# Patient Record
Sex: FEMALE | Race: WHITE | NOT HISPANIC OR LATINO | Employment: UNEMPLOYED | URBAN - METROPOLITAN AREA
[De-identification: names, ages, dates, MRNs, and addresses within clinical notes are randomized per-mention and may not be internally consistent; named-entity substitution may affect disease eponyms.]

---

## 2017-01-17 ENCOUNTER — ALLSCRIPTS OFFICE VISIT (OUTPATIENT)
Dept: PERINATAL CARE | Facility: MEDICAL CENTER | Age: 37
End: 2017-01-17
Payer: COMMERCIAL

## 2017-01-17 ENCOUNTER — GENERIC CONVERSION - ENCOUNTER (OUTPATIENT)
Dept: OTHER | Facility: OTHER | Age: 37
End: 2017-01-17

## 2017-01-17 PROCEDURE — 76816 OB US FOLLOW-UP PER FETUS: CPT | Performed by: OBSTETRICS & GYNECOLOGY

## 2017-01-21 ENCOUNTER — ALLSCRIPTS OFFICE VISIT (OUTPATIENT)
Dept: OTHER | Facility: OTHER | Age: 37
End: 2017-01-21

## 2017-01-21 LAB
GLUCOSE (HISTORICAL): NORMAL
PROT UR STRIP-MCNC: NORMAL MG/DL

## 2017-01-30 ENCOUNTER — GENERIC CONVERSION - ENCOUNTER (OUTPATIENT)
Dept: OTHER | Facility: OTHER | Age: 37
End: 2017-01-30

## 2017-02-01 LAB — EXTERNAL GROUP B STREP ANTIGEN: NEGATIVE

## 2017-02-03 ENCOUNTER — ALLSCRIPTS OFFICE VISIT (OUTPATIENT)
Dept: OTHER | Facility: OTHER | Age: 37
End: 2017-02-03

## 2017-02-03 LAB
GLUCOSE (HISTORICAL): NEGATIVE
PROT UR STRIP-MCNC: NEGATIVE MG/DL

## 2017-02-13 ENCOUNTER — HOSPITAL ENCOUNTER (EMERGENCY)
Facility: HOSPITAL | Age: 37
Discharge: HOME | End: 2017-02-13
Attending: EMERGENCY MEDICINE | Admitting: EMERGENCY MEDICINE
Payer: COMMERCIAL

## 2017-02-13 VITALS
DIASTOLIC BLOOD PRESSURE: 61 MMHG | TEMPERATURE: 98.1 F | RESPIRATION RATE: 16 BRPM | SYSTOLIC BLOOD PRESSURE: 122 MMHG | OXYGEN SATURATION: 100 % | WEIGHT: 184.9 LBS | BODY MASS INDEX: 28.96 KG/M2 | HEART RATE: 109 BPM

## 2017-02-13 DIAGNOSIS — J40 BRONCHITIS: Primary | ICD-10-CM

## 2017-02-13 DIAGNOSIS — Z3A.33 33 WEEKS GESTATION OF PREGNANCY: ICD-10-CM

## 2017-02-13 DIAGNOSIS — R06.2 WHEEZE: ICD-10-CM

## 2017-02-13 PROCEDURE — 94640 AIRWAY INHALATION TREATMENT: CPT

## 2017-02-13 PROCEDURE — 99284 EMERGENCY DEPT VISIT MOD MDM: CPT

## 2017-02-13 RX ORDER — AZITHROMYCIN 250 MG/1
TABLET, FILM COATED ORAL
Qty: 6 TABLET | Refills: 0 | Status: SHIPPED | OUTPATIENT
Start: 2017-02-13 | End: 2017-02-21

## 2017-02-13 RX ORDER — PREDNISONE 20 MG/1
60 TABLET ORAL ONCE
Status: DISCONTINUED | OUTPATIENT
Start: 2017-02-13 | End: 2017-02-13 | Stop reason: HOSPADM

## 2017-02-13 RX ORDER — PREDNISONE 20 MG/1
60 TABLET ORAL DAILY
Qty: 15 TABLET | Refills: 0 | Status: SHIPPED | OUTPATIENT
Start: 2017-02-13 | End: 2017-02-18

## 2017-02-13 RX ORDER — ALBUTEROL SULFATE 1.25 MG/3ML
1 SOLUTION RESPIRATORY (INHALATION) EVERY 6 HOURS PRN
Qty: 30 VIAL | Refills: 0 | Status: SHIPPED | OUTPATIENT
Start: 2017-02-13 | End: 2017-02-21

## 2017-02-13 RX ADMIN — Medication 5 MG: at 08:24

## 2017-02-13 RX ADMIN — ALBUTEROL SULFATE 5 MG: 2.5 SOLUTION RESPIRATORY (INHALATION) at 08:24

## 2017-02-13 RX ADMIN — Medication 0.5 MG: at 08:24

## 2017-02-13 RX ADMIN — IPRATROPIUM BROMIDE 0.5 MG: 0.5 SOLUTION RESPIRATORY (INHALATION) at 08:24

## 2017-02-14 ENCOUNTER — GENERIC CONVERSION - ENCOUNTER (OUTPATIENT)
Dept: OTHER | Facility: OTHER | Age: 37
End: 2017-02-14

## 2017-02-14 ENCOUNTER — ALLSCRIPTS OFFICE VISIT (OUTPATIENT)
Dept: PERINATAL CARE | Facility: MEDICAL CENTER | Age: 37
End: 2017-02-14
Payer: COMMERCIAL

## 2017-02-14 PROCEDURE — 76816 OB US FOLLOW-UP PER FETUS: CPT | Performed by: OBSTETRICS & GYNECOLOGY

## 2017-02-18 ENCOUNTER — GENERIC CONVERSION - ENCOUNTER (OUTPATIENT)
Dept: OTHER | Facility: OTHER | Age: 37
End: 2017-02-18

## 2017-02-19 ENCOUNTER — HOSPITAL ENCOUNTER (EMERGENCY)
Facility: HOSPITAL | Age: 37
Discharge: HOME | End: 2017-02-19
Attending: EMERGENCY MEDICINE
Payer: COMMERCIAL

## 2017-02-19 VITALS
BODY MASS INDEX: 29 KG/M2 | DIASTOLIC BLOOD PRESSURE: 59 MMHG | OXYGEN SATURATION: 95 % | HEART RATE: 88 BPM | RESPIRATION RATE: 18 BRPM | SYSTOLIC BLOOD PRESSURE: 120 MMHG | TEMPERATURE: 98.4 F | WEIGHT: 185.19 LBS

## 2017-02-19 DIAGNOSIS — J45.901 ASTHMA EXACERBATION: Primary | ICD-10-CM

## 2017-02-19 LAB
ALBUMIN SERPL BCP-MCNC: 2.5 G/DL (ref 3.5–5)
ALP SERPL-CCNC: 98 U/L (ref 46–116)
ALT SERPL W P-5'-P-CCNC: 23 U/L (ref 12–78)
ANION GAP SERPL CALCULATED.3IONS-SCNC: 9 MMOL/L (ref 4–13)
AST SERPL W P-5'-P-CCNC: 19 U/L (ref 5–45)
BASOPHILS # BLD AUTO: 0.02 THOUSANDS/ΜL (ref 0–0.1)
BASOPHILS NFR BLD AUTO: 0 % (ref 0–1)
BILIRUB SERPL-MCNC: 0.1 MG/DL (ref 0.2–1)
BUN SERPL-MCNC: 4 MG/DL (ref 5–25)
CALCIUM SERPL-MCNC: 8.4 MG/DL (ref 8.3–10.1)
CHLORIDE SERPL-SCNC: 103 MMOL/L (ref 100–108)
CO2 SERPL-SCNC: 28 MMOL/L (ref 21–32)
CREAT SERPL-MCNC: 0.6 MG/DL (ref 0.6–1.3)
EOSINOPHIL # BLD AUTO: 0.28 THOUSAND/ΜL (ref 0–0.61)
EOSINOPHIL NFR BLD AUTO: 2 % (ref 0–6)
ERYTHROCYTE [DISTWIDTH] IN BLOOD BY AUTOMATED COUNT: 13.4 % (ref 11.6–15.1)
GFR SERPL CREATININE-BSD FRML MDRD: >60 ML/MIN/1.73SQ M
GLUCOSE SERPL-MCNC: 87 MG/DL (ref 65–140)
HCT VFR BLD AUTO: 33.7 % (ref 34.8–46.1)
HGB BLD-MCNC: 10.9 G/DL (ref 11.5–15.4)
LYMPHOCYTES # BLD AUTO: 3.95 THOUSANDS/ΜL (ref 0.6–4.47)
LYMPHOCYTES NFR BLD AUTO: 24 % (ref 14–44)
MAGNESIUM SERPL-MCNC: 1.7 MG/DL (ref 1.6–2.6)
MCH RBC QN AUTO: 30.8 PG (ref 26.8–34.3)
MCHC RBC AUTO-ENTMCNC: 32.3 G/DL (ref 31.4–37.4)
MCV RBC AUTO: 95 FL (ref 82–98)
MONOCYTES # BLD AUTO: 1.06 THOUSAND/ΜL (ref 0.17–1.22)
MONOCYTES NFR BLD AUTO: 6 % (ref 4–12)
NEUTROPHILS # BLD AUTO: 11.35 THOUSANDS/ΜL (ref 1.85–7.62)
NEUTS SEG NFR BLD AUTO: 68 % (ref 43–75)
PLATELET # BLD AUTO: 210 THOUSANDS/UL (ref 149–390)
PMV BLD AUTO: 11.1 FL (ref 8.9–12.7)
POTASSIUM SERPL-SCNC: 3.8 MMOL/L (ref 3.5–5.3)
PROT SERPL-MCNC: 6.7 G/DL (ref 6.4–8.2)
RBC # BLD AUTO: 3.54 MILLION/UL (ref 3.81–5.12)
SODIUM SERPL-SCNC: 140 MMOL/L (ref 136–145)
WBC # BLD AUTO: 16.66 THOUSAND/UL (ref 4.31–10.16)

## 2017-02-19 PROCEDURE — 85025 COMPLETE CBC W/AUTO DIFF WBC: CPT | Performed by: EMERGENCY MEDICINE

## 2017-02-19 PROCEDURE — 83735 ASSAY OF MAGNESIUM: CPT | Performed by: EMERGENCY MEDICINE

## 2017-02-19 PROCEDURE — 99283 EMERGENCY DEPT VISIT LOW MDM: CPT

## 2017-02-19 PROCEDURE — 36415 COLL VENOUS BLD VENIPUNCTURE: CPT | Performed by: EMERGENCY MEDICINE

## 2017-02-19 PROCEDURE — 94640 AIRWAY INHALATION TREATMENT: CPT

## 2017-02-19 PROCEDURE — 80053 COMPREHEN METABOLIC PANEL: CPT | Performed by: EMERGENCY MEDICINE

## 2017-02-19 RX ORDER — ALBUTEROL SULFATE 2.5 MG/3ML
5 SOLUTION RESPIRATORY (INHALATION) ONCE
Status: COMPLETED | OUTPATIENT
Start: 2017-02-19 | End: 2017-02-19

## 2017-02-19 RX ORDER — PREDNISONE 20 MG/1
60 TABLET ORAL ONCE
Status: COMPLETED | OUTPATIENT
Start: 2017-02-19 | End: 2017-02-19

## 2017-02-19 RX ORDER — ALBUTEROL SULFATE 2.5 MG/3ML
2.5 SOLUTION RESPIRATORY (INHALATION) EVERY 6 HOURS PRN
Qty: 75 ML | Refills: 0 | Status: ON HOLD | OUTPATIENT
Start: 2017-02-19 | End: 2017-03-19 | Stop reason: ALTCHOICE

## 2017-02-19 RX ORDER — BUPRENORPHINE 2 MG/1
8 TABLET SUBLINGUAL DAILY
COMMUNITY
End: 2021-08-09

## 2017-02-19 RX ORDER — PREDNISONE 10 MG/1
TABLET ORAL
Qty: 20 TABLET | Refills: 0 | Status: ON HOLD | OUTPATIENT
Start: 2017-02-19 | End: 2017-03-19 | Stop reason: ALTCHOICE

## 2017-02-19 RX ADMIN — ALBUTEROL SULFATE 5 MG: 2.5 SOLUTION RESPIRATORY (INHALATION) at 13:58

## 2017-02-19 RX ADMIN — ALBUTEROL SULFATE 5 MG: 2.5 SOLUTION RESPIRATORY (INHALATION) at 12:40

## 2017-02-19 RX ADMIN — PREDNISONE 60 MG: 20 TABLET ORAL at 12:51

## 2017-02-21 ENCOUNTER — HOSPITAL ENCOUNTER (EMERGENCY)
Facility: HOSPITAL | Age: 37
Discharge: HOME | End: 2017-02-22
Attending: EMERGENCY MEDICINE | Admitting: EMERGENCY MEDICINE
Payer: COMMERCIAL

## 2017-02-21 VITALS
RESPIRATION RATE: 16 BRPM | OXYGEN SATURATION: 98 % | HEART RATE: 93 BPM | DIASTOLIC BLOOD PRESSURE: 57 MMHG | SYSTOLIC BLOOD PRESSURE: 127 MMHG | TEMPERATURE: 98.2 F | BODY MASS INDEX: 28.35 KG/M2 | WEIGHT: 181 LBS

## 2017-02-21 DIAGNOSIS — R07.81 RIB PAIN: Primary | ICD-10-CM

## 2017-02-21 RX ORDER — LIDOCAINE 50 MG/G
PATCH TOPICAL
Status: DISCONTINUED
Start: 2017-02-21 | End: 2017-02-22 | Stop reason: HOSPADM

## 2017-02-21 RX ORDER — ACETAMINOPHEN 325 MG/1
TABLET ORAL
Status: COMPLETED
Start: 2017-02-21 | End: 2017-02-21

## 2017-02-21 RX ADMIN — LIDOCAINE 1 PATCH: 50 PATCH CUTANEOUS at 21:15

## 2017-02-21 RX ADMIN — LIDOCAINE 1 PATCH: 50 PATCH TOPICAL at 21:15

## 2017-02-21 RX ADMIN — ACETAMINOPHEN 650 MG: 325 TABLET ORAL at 21:15

## 2017-02-22 PROCEDURE — 99283 EMERGENCY DEPT VISIT LOW MDM: CPT

## 2017-02-22 RX ORDER — ACETAMINOPHEN 325 MG/1
650 TABLET ORAL ONCE
Status: COMPLETED | OUTPATIENT
Start: 2017-02-22 | End: 2017-02-21

## 2017-02-22 RX ORDER — LIDOCAINE 50 MG/G
1 PATCH TOPICAL ONCE
Status: DISCONTINUED | OUTPATIENT
Start: 2017-02-22 | End: 2017-02-22 | Stop reason: HOSPADM

## 2017-03-03 ENCOUNTER — ALLSCRIPTS OFFICE VISIT (OUTPATIENT)
Dept: OTHER | Facility: OTHER | Age: 37
End: 2017-03-03

## 2017-03-06 ENCOUNTER — GENERIC CONVERSION - ENCOUNTER (OUTPATIENT)
Dept: OTHER | Facility: OTHER | Age: 37
End: 2017-03-06

## 2017-03-06 LAB — CULTURE GENITAL-BSB ON (HISTORICAL): NORMAL

## 2017-03-10 ENCOUNTER — GENERIC CONVERSION - ENCOUNTER (OUTPATIENT)
Dept: OTHER | Facility: OTHER | Age: 37
End: 2017-03-10

## 2017-03-19 ENCOUNTER — HOSPITAL ENCOUNTER (OUTPATIENT)
Facility: HOSPITAL | Age: 37
Discharge: HOME | End: 2017-03-19
Attending: OBSTETRICS & GYNECOLOGY | Admitting: OBSTETRICS & GYNECOLOGY
Payer: COMMERCIAL

## 2017-03-19 VITALS
HEART RATE: 88 BPM | RESPIRATION RATE: 18 BRPM | OXYGEN SATURATION: 99 % | TEMPERATURE: 98.3 F | SYSTOLIC BLOOD PRESSURE: 121 MMHG | DIASTOLIC BLOOD PRESSURE: 82 MMHG

## 2017-03-19 PROCEDURE — 99213 OFFICE O/P EST LOW 20 MIN: CPT

## 2017-03-24 ENCOUNTER — GENERIC CONVERSION - ENCOUNTER (OUTPATIENT)
Dept: OTHER | Facility: OTHER | Age: 37
End: 2017-03-24

## 2017-03-27 ENCOUNTER — HOSPITAL ENCOUNTER (OUTPATIENT)
Dept: LABOR AND DELIVERY | Facility: HOSPITAL | Age: 37
Discharge: HOME | DRG: 560 | End: 2017-03-27
Payer: COMMERCIAL

## 2017-03-27 ENCOUNTER — ANESTHESIA EVENT (INPATIENT)
Dept: LABOR AND DELIVERY | Facility: HOSPITAL | Age: 37
DRG: 560 | End: 2017-03-27
Payer: COMMERCIAL

## 2017-03-27 ENCOUNTER — HOSPITAL ENCOUNTER (INPATIENT)
Facility: HOSPITAL | Age: 37
LOS: 2 days | Discharge: HOME | DRG: 560 | End: 2017-03-29
Attending: OBSTETRICS & GYNECOLOGY | Admitting: OBSTETRICS & GYNECOLOGY
Payer: COMMERCIAL

## 2017-03-27 DIAGNOSIS — Z3A.39 39 WEEKS GESTATION OF PREGNANCY: Primary | ICD-10-CM

## 2017-03-27 LAB
ABO GROUP BLD: NORMAL
AMPHETAMINES SERPL QL SCN: NEGATIVE
BARBITURATES UR QL: NEGATIVE
BENZODIAZ UR QL: NEGATIVE
BLD GP AB SCN SERPL QL: NEGATIVE
COCAINE UR QL: NEGATIVE
ERYTHROCYTE [DISTWIDTH] IN BLOOD BY AUTOMATED COUNT: 13.6 % (ref 11.6–15.1)
HCT VFR BLD AUTO: 33.7 % (ref 34.8–46.1)
HGB BLD-MCNC: 11.1 G/DL (ref 11.5–15.4)
MCH RBC QN AUTO: 30.7 PG (ref 26.8–34.3)
MCHC RBC AUTO-ENTMCNC: 32.9 G/DL (ref 31.4–37.4)
MCV RBC AUTO: 93 FL (ref 82–98)
METHADONE UR QL: NEGATIVE
OPIATES UR QL SCN: NEGATIVE
PCP UR QL: NEGATIVE
PLATELET # BLD AUTO: 201 THOUSANDS/UL (ref 149–390)
PMV BLD AUTO: 11.8 FL (ref 8.9–12.7)
RBC # BLD AUTO: 3.61 MILLION/UL (ref 3.81–5.12)
RH BLD: POSITIVE
THC UR QL: NEGATIVE
WBC # BLD AUTO: 14.97 THOUSAND/UL (ref 4.31–10.16)

## 2017-03-27 PROCEDURE — 86592 SYPHILIS TEST NON-TREP QUAL: CPT | Performed by: OBSTETRICS & GYNECOLOGY

## 2017-03-27 PROCEDURE — 86901 BLOOD TYPING SEROLOGIC RH(D): CPT | Performed by: OBSTETRICS & GYNECOLOGY

## 2017-03-27 PROCEDURE — 10907ZC DRAINAGE OF AMNIOTIC FLUID, THERAPEUTIC FROM PRODUCTS OF CONCEPTION, VIA NATURAL OR ARTIFICIAL OPENING: ICD-10-PCS | Performed by: OBSTETRICS & GYNECOLOGY

## 2017-03-27 PROCEDURE — 80307 DRUG TEST PRSMV CHEM ANLYZR: CPT | Performed by: OBSTETRICS & GYNECOLOGY

## 2017-03-27 PROCEDURE — 4A1HXCZ MONITORING OF PRODUCTS OF CONCEPTION, CARDIAC RATE, EXTERNAL APPROACH: ICD-10-PCS | Performed by: OBSTETRICS & GYNECOLOGY

## 2017-03-27 PROCEDURE — 85027 COMPLETE CBC AUTOMATED: CPT | Performed by: OBSTETRICS & GYNECOLOGY

## 2017-03-27 PROCEDURE — 3E033VJ INTRODUCTION OF OTHER HORMONE INTO PERIPHERAL VEIN, PERCUTANEOUS APPROACH: ICD-10-PCS | Performed by: OBSTETRICS & GYNECOLOGY

## 2017-03-27 PROCEDURE — 86850 RBC ANTIBODY SCREEN: CPT | Performed by: OBSTETRICS & GYNECOLOGY

## 2017-03-27 PROCEDURE — 86900 BLOOD TYPING SEROLOGIC ABO: CPT | Performed by: OBSTETRICS & GYNECOLOGY

## 2017-03-27 RX ORDER — CARBOPROST TROMETHAMINE 250 UG/ML
INJECTION, SOLUTION INTRAMUSCULAR
Status: DISCONTINUED
Start: 2017-03-27 | End: 2017-03-28 | Stop reason: WASHOUT

## 2017-03-27 RX ORDER — ROPIVACAINE HYDROCHLORIDE 2 MG/ML
INJECTION, SOLUTION EPIDURAL; INFILTRATION; PERINEURAL CONTINUOUS PRN
Status: DISCONTINUED | OUTPATIENT
Start: 2017-03-27 | End: 2017-03-28 | Stop reason: SURG

## 2017-03-27 RX ORDER — OXYTOCIN/RINGER'S LACTATE 30/500 ML
1-30 PLASTIC BAG, INJECTION (ML) INTRAVENOUS
Status: DISCONTINUED | OUTPATIENT
Start: 2017-03-27 | End: 2017-03-29 | Stop reason: HOSPADM

## 2017-03-27 RX ORDER — BUPRENORPHINE HYDROCHLORIDE 8 MG/1
24 TABLET SUBLINGUAL DAILY
Status: DISCONTINUED | OUTPATIENT
Start: 2017-03-28 | End: 2017-03-28

## 2017-03-27 RX ORDER — SODIUM CHLORIDE, SODIUM LACTATE, POTASSIUM CHLORIDE, CALCIUM CHLORIDE 600; 310; 30; 20 MG/100ML; MG/100ML; MG/100ML; MG/100ML
125 INJECTION, SOLUTION INTRAVENOUS CONTINUOUS
Status: DISCONTINUED | OUTPATIENT
Start: 2017-03-27 | End: 2017-03-29 | Stop reason: HOSPADM

## 2017-03-27 RX ORDER — BUPRENORPHINE 2 MG/1
8 TABLET SUBLINGUAL DAILY
Status: DISCONTINUED | OUTPATIENT
Start: 2017-03-28 | End: 2017-03-27

## 2017-03-27 RX ORDER — FENTANYL CITRATE 50 UG/ML
INJECTION, SOLUTION INTRAMUSCULAR; INTRAVENOUS AS NEEDED
Status: DISCONTINUED | OUTPATIENT
Start: 2017-03-27 | End: 2017-03-28 | Stop reason: SURG

## 2017-03-27 RX ORDER — METHYLERGONOVINE MALEATE 0.2 MG/ML
INJECTION INTRAVENOUS
Status: DISCONTINUED
Start: 2017-03-27 | End: 2017-03-28 | Stop reason: WASHOUT

## 2017-03-27 RX ADMIN — SODIUM CHLORIDE, SODIUM LACTATE, POTASSIUM CHLORIDE, AND CALCIUM CHLORIDE 125 ML/HR: .6; .31; .03; .02 INJECTION, SOLUTION INTRAVENOUS at 22:15

## 2017-03-27 RX ADMIN — SODIUM CHLORIDE, SODIUM LACTATE, POTASSIUM CHLORIDE, AND CALCIUM CHLORIDE 125 ML/HR: .6; .31; .03; .02 INJECTION, SOLUTION INTRAVENOUS at 17:55

## 2017-03-27 RX ADMIN — ROPIVACAINE HYDROCHLORIDE 10 ML/HR: 2 INJECTION, SOLUTION EPIDURAL; INFILTRATION at 19:57

## 2017-03-27 RX ADMIN — Medication 2 MILLI-UNITS/MIN: at 18:13

## 2017-03-27 RX ADMIN — SODIUM CHLORIDE, SODIUM LACTATE, POTASSIUM CHLORIDE, AND CALCIUM CHLORIDE 125 ML/HR: .6; .31; .03; .02 INJECTION, SOLUTION INTRAVENOUS at 19:30

## 2017-03-27 RX ADMIN — FENTANYL CITRATE 100 MCG: 50 INJECTION, SOLUTION INTRAMUSCULAR; INTRAVENOUS at 20:07

## 2017-03-28 PROBLEM — Z3A.39 39 WEEKS GESTATION OF PREGNANCY: Status: RESOLVED | Noted: 2017-03-27 | Resolved: 2017-03-28

## 2017-03-28 LAB
BASE EXCESS BLDCOA CALC-SCNC: -1.5 MMOL/L (ref 3–11)
BASE EXCESS BLDCOV CALC-SCNC: -1.9 MMOL/L (ref 1–9)
HCO3 BLDCOA-SCNC: 25.4 MMOL/L (ref 17.3–27.3)
HCO3 BLDCOV-SCNC: 22.7 MMOL/L (ref 12.2–28.6)
O2 CT VFR BLDCOA CALC: 7.8 ML/DL
OXYHGB MFR BLDCOA: 38.5 %
OXYHGB MFR BLDCOV: 74.5 %
PCO2 BLDCOA: 51.4 MM[HG] (ref 30–60)
PCO2 BLDCOV: 38.3 MM HG (ref 27–43)
PH BLDCOA: 7.31 [PH] (ref 7.23–7.43)
PH BLDCOV: 7.39 [PH] (ref 7.19–7.49)
PO2 BLDCOA: 18.9 MM HG (ref 5–25)
PO2 BLDCOV: 32.5 MM HG (ref 15–45)
RPR SER QL: NORMAL
SAO2 % BLDCOV: 15.7 ML/DL

## 2017-03-28 PROCEDURE — 82805 BLOOD GASES W/O2 SATURATION: CPT | Performed by: OBSTETRICS & GYNECOLOGY

## 2017-03-28 RX ORDER — DOCUSATE SODIUM 100 MG/1
100 CAPSULE, LIQUID FILLED ORAL 2 TIMES DAILY
Status: DISCONTINUED | OUTPATIENT
Start: 2017-03-28 | End: 2017-03-29 | Stop reason: HOSPADM

## 2017-03-28 RX ORDER — IBUPROFEN 600 MG/1
TABLET ORAL
Status: COMPLETED
Start: 2017-03-28 | End: 2017-03-28

## 2017-03-28 RX ORDER — ACETAMINOPHEN 325 MG/1
650 TABLET ORAL EVERY 4 HOURS PRN
Status: DISCONTINUED | OUTPATIENT
Start: 2017-03-28 | End: 2017-03-29 | Stop reason: HOSPADM

## 2017-03-28 RX ORDER — DIAPER,BRIEF,INFANT-TODD,DISP
1 EACH MISCELLANEOUS AS NEEDED
Status: DISCONTINUED | OUTPATIENT
Start: 2017-03-28 | End: 2017-03-29 | Stop reason: HOSPADM

## 2017-03-28 RX ORDER — KETOROLAC TROMETHAMINE 30 MG/ML
30 INJECTION, SOLUTION INTRAMUSCULAR; INTRAVENOUS EVERY 6 HOURS PRN
Status: DISCONTINUED | OUTPATIENT
Start: 2017-03-28 | End: 2017-03-28

## 2017-03-28 RX ORDER — IBUPROFEN 600 MG/1
600 TABLET ORAL EVERY 6 HOURS PRN
Status: DISCONTINUED | OUTPATIENT
Start: 2017-03-28 | End: 2017-03-29 | Stop reason: HOSPADM

## 2017-03-28 RX ORDER — CALCIUM CARBONATE 200(500)MG
1000 TABLET,CHEWABLE ORAL DAILY PRN
Status: DISCONTINUED | OUTPATIENT
Start: 2017-03-28 | End: 2017-03-29 | Stop reason: HOSPADM

## 2017-03-28 RX ORDER — BISACODYL 10 MG
10 SUPPOSITORY, RECTAL RECTAL AS NEEDED
Status: DISCONTINUED | OUTPATIENT
Start: 2017-03-28 | End: 2017-03-29 | Stop reason: HOSPADM

## 2017-03-28 RX ORDER — BUPRENORPHINE HYDROCHLORIDE 8 MG/1
12 TABLET SUBLINGUAL 2 TIMES DAILY
Status: DISCONTINUED | OUTPATIENT
Start: 2017-03-28 | End: 2017-03-29 | Stop reason: HOSPADM

## 2017-03-28 RX ORDER — ONDANSETRON 2 MG/ML
4 INJECTION INTRAMUSCULAR; INTRAVENOUS EVERY 8 HOURS PRN
Status: DISCONTINUED | OUTPATIENT
Start: 2017-03-28 | End: 2017-03-29 | Stop reason: HOSPADM

## 2017-03-28 RX ORDER — IBUPROFEN 600 MG/1
600 TABLET ORAL EVERY 6 HOURS PRN
Status: DISCONTINUED | OUTPATIENT
Start: 2017-04-02 | End: 2017-03-28

## 2017-03-28 RX ADMIN — DOCUSATE SODIUM 100 MG: 100 CAPSULE, LIQUID FILLED ORAL at 09:02

## 2017-03-28 RX ADMIN — BENZOCAINE AND MENTHOL: 20; .5 SPRAY TOPICAL at 09:04

## 2017-03-28 RX ADMIN — IBUPROFEN 600 MG: 600 TABLET, FILM COATED ORAL at 15:09

## 2017-03-28 RX ADMIN — KETOROLAC TROMETHAMINE 30 MG: 30 INJECTION, SOLUTION INTRAMUSCULAR at 02:28

## 2017-03-28 RX ADMIN — BENZOCAINE AND MENTHOL: 20; .5 SPRAY TOPICAL at 01:13

## 2017-03-28 RX ADMIN — BUPRENORPHINE HYDROCHLORIDE 12 MG: 8 TABLET SUBLINGUAL at 18:05

## 2017-03-28 RX ADMIN — IBUPROFEN 600 MG: 600 TABLET, FILM COATED ORAL at 23:11

## 2017-03-28 RX ADMIN — BUPRENORPHINE HYDROCHLORIDE 12 MG: 8 TABLET SUBLINGUAL at 09:02

## 2017-03-29 VITALS
TEMPERATURE: 97.9 F | OXYGEN SATURATION: 99 % | RESPIRATION RATE: 18 BRPM | HEIGHT: 67 IN | BODY MASS INDEX: 29.82 KG/M2 | WEIGHT: 190 LBS | HEART RATE: 74 BPM | SYSTOLIC BLOOD PRESSURE: 115 MMHG | DIASTOLIC BLOOD PRESSURE: 61 MMHG

## 2017-03-29 RX ORDER — IBUPROFEN 600 MG/1
600 TABLET ORAL EVERY 6 HOURS PRN
Qty: 30 TABLET | Refills: 0 | Status: SHIPPED | OUTPATIENT
Start: 2017-03-29 | End: 2017-04-05

## 2017-03-29 RX ADMIN — IBUPROFEN 600 MG: 600 TABLET, FILM COATED ORAL at 14:01

## 2017-03-29 RX ADMIN — IBUPROFEN 600 MG: 600 TABLET, FILM COATED ORAL at 06:35

## 2017-03-29 RX ADMIN — BUPRENORPHINE HYDROCHLORIDE 12 MG: 8 TABLET SUBLINGUAL at 10:30

## 2017-03-29 RX ADMIN — DOCUSATE SODIUM 100 MG: 100 CAPSULE, LIQUID FILLED ORAL at 10:31

## 2017-04-03 DIAGNOSIS — M79.89 OTHER DISORDERS OF SOFT TISSUE: ICD-10-CM

## 2017-04-04 ENCOUNTER — HOSPITAL ENCOUNTER (OUTPATIENT)
Dept: RADIOLOGY | Facility: MEDICAL CENTER | Age: 37
Discharge: HOME | End: 2017-04-04
Payer: COMMERCIAL

## 2017-04-04 DIAGNOSIS — M79.89 OTHER DISORDERS OF SOFT TISSUE: ICD-10-CM

## 2017-04-04 PROCEDURE — 93971 EXTREMITY STUDY: CPT

## 2017-04-05 ENCOUNTER — HOSPITAL ENCOUNTER (EMERGENCY)
Facility: HOSPITAL | Age: 37
Discharge: HOME | End: 2017-04-05
Attending: EMERGENCY MEDICINE | Admitting: EMERGENCY MEDICINE
Payer: COMMERCIAL

## 2017-04-05 ENCOUNTER — APPOINTMENT (EMERGENCY)
Dept: RADIOLOGY | Facility: HOSPITAL | Age: 37
End: 2017-04-05
Payer: COMMERCIAL

## 2017-04-05 VITALS
SYSTOLIC BLOOD PRESSURE: 140 MMHG | OXYGEN SATURATION: 98 % | BODY MASS INDEX: 26.59 KG/M2 | TEMPERATURE: 98.4 F | RESPIRATION RATE: 18 BRPM | DIASTOLIC BLOOD PRESSURE: 60 MMHG | WEIGHT: 169.75 LBS | HEART RATE: 85 BPM

## 2017-04-05 DIAGNOSIS — S86.912A MUSCLE STRAIN OF LOWER LEG, LEFT, INITIAL ENCOUNTER: Primary | ICD-10-CM

## 2017-04-05 LAB — PLACENTA IN STORAGE: NORMAL

## 2017-04-05 PROCEDURE — 96372 THER/PROPH/DIAG INJ SC/IM: CPT

## 2017-04-05 PROCEDURE — 73590 X-RAY EXAM OF LOWER LEG: CPT

## 2017-04-05 PROCEDURE — 99283 EMERGENCY DEPT VISIT LOW MDM: CPT

## 2017-04-05 RX ORDER — KETOROLAC TROMETHAMINE 30 MG/ML
30 INJECTION, SOLUTION INTRAMUSCULAR; INTRAVENOUS ONCE
Status: COMPLETED | OUTPATIENT
Start: 2017-04-05 | End: 2017-04-05

## 2017-04-05 RX ORDER — IBUPROFEN 800 MG/1
800 TABLET ORAL EVERY 6 HOURS PRN
Qty: 30 TABLET | Refills: 0 | Status: SHIPPED | OUTPATIENT
Start: 2017-04-05 | End: 2021-08-09

## 2017-04-05 RX ADMIN — KETOROLAC TROMETHAMINE 30 MG: 30 INJECTION, SOLUTION INTRAMUSCULAR at 17:16

## 2017-04-28 ENCOUNTER — GENERIC CONVERSION - ENCOUNTER (OUTPATIENT)
Dept: OTHER | Facility: OTHER | Age: 37
End: 2017-04-28

## 2018-01-11 NOTE — PROGRESS NOTES
SEP 22 2016         RE: Paul Sandoval                                  To: [facility]   MR#: [id number]                                    [address]   : MAY [address], [address] [place] [address]   ENC: [id number]                             Fax: [phone]   (Exam #: [id number])      The LMP of this 31year old,  G8, P4-0-3-4 patient was unknown, her   working THIAGO is MAR 29 2017 and the current gestational age is 17 weeks 2   days by 1st Trimester Sono  A sonographic examination was performed on SEP   22 2016 using real time equipment  The ultrasound examination was   performed using abdominal technique  The patient has a BMI of 24 6  Her   blood pressure today was 101/53  Earliest ultrasound found in her record: 16  6w2d  3/28/17 THIAGO   Jhonathan Powell is currently on Subutex 8 mg 3 times a day through Dr Karlo Griffin in [facility] [phone]  She is also on prenatal vitamins and   denies any known drug allergies  She does smoke cigarettes but has weaned   herself down to 8 cigarettes a day and is planning on stopping  She denies   any use of alcohol or any illicit drugs in pregnancy outside of her   Subutex  Her past medical history is significant for 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   and narcotics in the past and she is now been on Subutex for about 7   years  She denies any prior surgical history  Her OB history is   significant for 4 term uncomplicated pregnancies  One of her babies   weighed 9 lbs  10 oz  and was delivered vaginally without complication  She denies any first generation family history of hypertension, diabetes,   thrombosis or congenital anomalies  Multiple longitudinal and transverse sections revealed a mathur   intrauterine pregnancy with the fetus in breech presentation  The placenta   is anterior in implantation        Cardiac motion was observed at 146 bpm       INDICATIONS      first trimester genetic screening   Subutex use smoker   prior LGA infant   grand multip      Exam Types      Level I      RESULTS      Fetus # 1 of 1   Breech presentation      MEASUREMENTS (* Included In Average GA)      CRL              7 6 cm        13 weeks 3 days*   Nuchal Trans    2 10 mm      THE AVERAGE GESTATIONAL AGE is 13 weeks 3 days +/- 7 days  UTERINE ARTERIES                                  S/D   PI    RI    NOTCH       Left Uterine Artery        4 70  1 71  0 79       Right Uterine Artery       3 08  1 24  0 68      CERVICAL EVALUATION      SUPINE      Cervical Length: 3 00 cm      ANATOMY COMMENTS      Anatomic detail is limited at this gestational age  The yolk sac was not   noted  The fetal cranium appeared normal in shape and the nuchal   translucency was normal in size (2 1mm)  The nasal bone appears to be   present  The intracranial anatomy was unremarkable  Evaluation of the   spine revealed no obvious evidence for a neural tube defect  Anatomy of   the fetal thorax appeared within normal limits  The cardiac rhythm was   regular  Within the abdomen, stomach & bladder were visualized and the   abdominal wall appeared intact  A three vessel cord appears to be present  Active movement of the fetal body & extremities was seen  There is no   suspicion of a subchorionic bleed  The placental cord insertion was   normal     The uterine artery Dopplers are normal for this gestational   age  There is no suspicion of a uterine myoma  Free fluid is not seen in   the posterior cul-de-sac        AMNIOTIC FLUID      Amniotic Fluid: Normal      IMPRESSION      Matthew IUP   13 weeks and 3 days by this ultrasound  (THIAGO=MAR 27 2017)   13 weeks and 2 days by 1st Tri Sono  (THIAGO=MAR 28 2017)   Breech presentation   Regular fetal heart rate of 146 bpm   Anterior placenta      GENERAL COMMENT      As per your request, a consultation was performed on your patient for the   following indication: sequential screen and a subutex use in pregnancy The patient was informed of the findings and counseled about the   limitations of the exam in detecting all forms of fetal congenital   abnormalities  She denies any vaginal bleeding or uterine cramping/contractions  Exam shows she is comfortable and her abdomen is non tender  Discussion:Subutex/Suboxone use in pregnancy was discussed  She is on 3 8   mg tabs daily but she feels she only need 1-2 tabs daily  A recent study   showed cessation of Subutex/ Suboxone in pregnancy is an option that would   lessen the risk  for  withdraw and did not show an increased risk   for fetal complications during the pregnancy  Some patient though will   resort back to using illicit agents if off of Subutex/ Suboxone  I would   not recommend weaning a patient off of her Subutex/ Suboxone unless she   was felt to be a complaint patient and who would not seek to use street   drugs instead  A stable Subutex/ Suboxone dose reduces fluctuations in   maternal opioid levels, which reduces stress on the fetus  Illicitly   purchased drugs may also be adulterated with other compounds that may be   harmful to the fetus, and Subutex/ Suboxone use in pregnancy reduces the   risk of fetal harm from exposure to such adulterants  Subutex/ Suboxone   maintenance also enhances the ability of the woman to participate in   prenatal care and addiction treatment  Subutex/ Suboxone requirements may   increase during the third trimester due to the larger plasma volume,   reduced protein binding, increased tissue binding, and increased   metabolism  Women can breast feed on Subutex/ Suboxone as long as they are   not abusing drugs and are not HIV infected  If a Subutex/ Suboxone maintenance client is hospitalized, such as for a   traumatic injury or medical illness, the maintenance dose should be   continued throughout hospitalization   The hospital physician should   contact the Subutex/ Suboxone maintenance program to verify the client's   current dose and arrange for dosing to resume after discharge  She states   she follows with Dr Katie Colvin from [place] [phone]  Subutex/ Suboxone maintenance patients with acute pain should be given   pain medications (whether opioid or nonopioid) as though they were not on   Subutex/ Suboxone  The only exception is that pentazocine (Talwin),   nalbuphine (Nubain), and butorphanol (Stadol) should not be administered   because these drugs have antagonist properties and can precipitate an   immediate withdrawal       Discussed possible fetal withdraw issues that will require at minimum a 5   day  stay  If the  is showing signs of withdrawal   postnatally then the  will have to stay for another 1- 6 weeks for   treatment  Addicted infants are often small for gestational age  In some   series up to a 1/3 weigh less then 2500 gms at term  She is aware that   long term effects of Subutex/ Suboxone on behavior and gross motor   development skills of these infants are not known  Today's ultrasound findings and suggested follow-up were discussed in   detail with the patient  The Sequential Screen was discussed in detail,   including the sensitivities for detection of Down syndrome, Trisomy 18,   and open neural tube defects  The patient underwent fingerstick blood   collection for hCG and PHILLIP-A to complete the initial component of the   Sequential Screen  Results should be available within one week  Follow up recommended:   1  Follow-up multiple marker serum screening at 16-18 weeks' gestation is   recommended to complete the Sequential Screen  2  Fetal Level II ultrasound imaging is recommended at 19-20 weeks'   gestation  3  Recommend a follow-up ultrasound at 28 and 34 weeks due to the Subutex   use in pregnancy     4  Recommend a NICU consult between 32-34 weeks of pregnancy to discuss    treatment of the infant due to her Subutex use in pregnancy  GIFTY Loza M D     Maternal-Fetal Medicine   Electronically signed 09/22/16 13:31

## 2018-01-11 NOTE — MISCELLANEOUS
Message  Message left for patient to call back to schedule NICU consult for SURENDRA  Active Problems    1  Asthma (493 90) (J45 909)   2  Encounter for fetal anatomic survey (V28 81) (Z36)   3  Encounter for pregnancy related examination in second trimester (V22 1) (Z34 82)   4  Encounter for screening for risk of pre-term labor (V28 82) (Z36)   5  Narcotic addiction (304 90) (F11 20)   6  Pregnancy (V22 2) (Z33 1)   7  Pregnancy complicated by subutex maintenance, antepartum (648 33,304 00,V58 69)   (O99 320,F11 20,Z79 891)    Current Meds   1  Buprenorphine HCl - 8 MG Sublingual Tablet Sublingual; TAKE ONE TABLET 3 TIMES A   DAY; Therapy: (Recorded:[id number]) to Recorded   2  Gummi Bear Multivitamin/Min Oral Tablet Chewable; Therapy: (Recorded:82Eic5854) to Recorded    Allergies    1  No Known Drug Allergies    2  No Known Environmental Allergies   3   No Known Food Allergies    Signatures   Electronically signed by : Constantino Libman, OM; Jan 30 2017 12:21PM EST                       (Author)

## 2018-01-13 VITALS — SYSTOLIC BLOOD PRESSURE: 116 MMHG | DIASTOLIC BLOOD PRESSURE: 70 MMHG | WEIGHT: 179 LBS | BODY MASS INDEX: 28.04 KG/M2

## 2018-01-13 VITALS
SYSTOLIC BLOOD PRESSURE: 101 MMHG | BODY MASS INDEX: 28.3 KG/M2 | DIASTOLIC BLOOD PRESSURE: 68 MMHG | HEIGHT: 67 IN | WEIGHT: 180.3 LBS

## 2018-01-13 VITALS — DIASTOLIC BLOOD PRESSURE: 70 MMHG | WEIGHT: 183 LBS | BODY MASS INDEX: 28.66 KG/M2 | SYSTOLIC BLOOD PRESSURE: 110 MMHG

## 2018-01-13 NOTE — MISCELLANEOUS
Message  NICU consult for SURENDRA scheduled with Maria T at NICU for 2/7 1 pm  Record faxed directly to [phone] and message left for patient  Active Problems    1  Asthma (493 90) (J45 909)   2  Encounter for fetal anatomic survey (V28 81) (Z36)   3  Encounter for pregnancy related examination in second trimester (V22 1) (Z34 82)   4  Encounter for screening for risk of pre-term labor (V28 82) (Z36)   5  Narcotic addiction (304 90) (F11 20)   6  Pregnancy (V22 2) (Z33 1)   7  Pregnancy complicated by subutex maintenance, antepartum (648 33,304 00,V58 69)   (O99 320,F11 20,Z79 891)    Current Meds   1  Buprenorphine HCl - 8 MG Sublingual Tablet Sublingual; TAKE ONE TABLET 3 TIMES A   DAY; Therapy: (Recorded:[id number]) to Recorded   2  Gummi Bear Multivitamin/Min Oral Tablet Chewable; Therapy: (Recorded:98Uqi3683) to Recorded    Allergies    1  No Known Drug Allergies    2  No Known Environmental Allergies   3   No Known Food Allergies    Signatures   Electronically signed by : Noé Mims OM; Jan 30 2017  2:27PM EST                       (Author)

## 2018-01-14 VITALS
SYSTOLIC BLOOD PRESSURE: 121 MMHG | BODY MASS INDEX: 28.35 KG/M2 | DIASTOLIC BLOOD PRESSURE: 75 MMHG | WEIGHT: 180.6 LBS | HEIGHT: 67 IN

## 2018-01-14 NOTE — PROGRESS NOTES
2017         RE: Haleigh Pop                                  To: [facility]   MR#: [id number]                                    [address]   : MAY 8 11 [facility], [address] [place] [address]   ENC: [id number]                             Fax: [phone]   (Exam #: [id number])      The LMP of this 31year old,  G8, P4-0-3-4 patient was unknown, her   working THIAGO is MAR 29 2017 and the current gestational age is 29 weeks 0   days by 1st Trimester Sono  A sonographic examination was performed on 2017 using real time equipment  The ultrasound examination was   performed using abdominal technique  The patient has a BMI of 28 2  Her   blood pressure today was 101/68  Earliest ultrasound found in her record: 16  6w2d  3/28/17 THIAGO      Cardiac motion was observed at 129 bpm       INDICATIONS      Subutex use   Evaluate missed anatomy      Exam Types      Level I      RESULTS      Fetus # 1 of 1   Vertex presentation   Fetal growth appeared normal   Placenta Location = Anterior   No placenta previa   Placenta Grade = II      MEASUREMENTS (* Included In Average GA)      AC              31 0 cm        35 weeks 0 days* (66%)   BPD              9 0 cm        36 weeks 4 days* (90%)   HC              32 9 cm        36 weeks 6 days* (81%)   Femur            6 5 cm        33 weeks 3 days* (44%)      HC/AC           1 06   FL/AC           0 21   FL/BPD          0 72   EFW (Ac/Fl/Hc)  2545 grams - 5 lbs 10 oz                 (57%)      THE AVERAGE GESTATIONAL AGE is 35 weeks 4 days +/- 21 days        AMNIOTIC FLUID      Q1: 2 8      Q2: 6 3      Q3: 6 2      Q4: 3 0   HAIDER Total = 18 3 cm   Amniotic Fluid: Normal      ANATOMY DETAILS      Visualized Appearing Sonographically Normal:   HEART: (Four Chamber View, Proximal Left Outflow, Proximal Right Outflow,   3VV, Cardiac Axis, Cardiac Position);    STOMACH, RIGHT KIDNEY, LEFT   KIDNEY, BLADDER, PLACENTA Suboptimally Visualized:   HEAD: (BPD Level)      Not Visualized:   HEAD: (Cavum, Lateral Ventricles, Cerebellum)      ANATOMY COMMENTS      The prior fetal anatomic survey was limited the area of the PALATE  The   views of the PALATE which were limited in the prior anatomic evaluation   are still limited today and therefore the fetal anatomic survey could   still not be completed  Umbilical cord cyst = 3 5 x 4 1 x 1 8cm      IMPRESSION      Matthew IUP   35 weeks and 4 days by this ultrasound  (THIAGO=MAR 17 2017)   34 weeks and 0 days by 1st Tri Sono  (THIAGO=MAR 28 2017)   Vertex presentation   Fetal growth appeared normal   Regular fetal heart rate of 129 bpm   Anterior placenta   No placenta previa      GENERAL COMMENT      On exam today the patient appears well, in no acute distress, and denies   any complaints  Her abdomen is non-tender  Morales Hazard has already undergone a   NICU consultation  There has been appropriate interval fetal growth  Good fetal movement and   tone are seen  The amniotic fluid volume appears normal   The placenta is   anterior and it appears sonographically normal   The anatomic structures   listed above could not be optimally imaged today because of fetal   position; however, these structures were seen on a prior ultrasound and   appeared sonographically normal   The patient was informed of today's   findings and all of her questions were answered  The limitations of   ultrasound were reviewed with the patient   labor precautions as   well as fetal kick counts were reviewed  Recommend further ultrasounds as clinically indicated  Thank you very much for allowing us to participate in the care of this   very nice patient  Should you have any questions, please do not hesitate   to contact our office        Please note, in addition to the time spent discussing the results of the   ultrasound, I spent approximately 10 minutes of face-to-face time with the   patient, greater than 50% of which was spent in counseling and the   coordination of care for this patient  GIFTY Marks M D     Electronically signed 02/14/17 13:43

## 2018-01-16 NOTE — RESULT NOTES
Verified Results  (1) SEQUENTIAL SCREEN 2 20Oct2016 11:24AM Carlos Horvath     Test Name Result Flag Reference   SCAN RESULT      Results available in the Counts include 234 beds at the [facility], [facility]

## 2018-01-18 NOTE — PROGRESS NOTES
2017         RE: Alejandro Teresa                                  To: [facility]   MR#: [id number]                                    [address]   : [facility] [address] [facility]   ENC: [id number]                             Fax: [phone]   (Exam #: [id number])      The LMP of this 31year old,  G8, P4-0-3-4 patient was unknown, her   working THIAGO is MAR 29 2017 and the current gestational age is 31 weeks 0   days by 1st Trimester Sono  A sonographic examination was performed on 2017 using real time equipment  The ultrasound examination was   performed using abdominal technique  The patient has a BMI of 28 2  Her   blood pressure today was 121/75  Earliest ultrasound found in her record: 16  6w2d  3/28/17 THIAGO      Oscar Goltz has no complaints  She reports regular fetal movement and denies   problems related to vaginal bleeding,  labor, or hypertension  She   intends to go to the lab for gestational diabetes screening later this   week  She continues to do well with Subutex therapy        Cardiac motion was observed at 126 bpm       INDICATIONS      Subutex use   Interval growth assesment   Evaluate missed anatomy      Exam Types      Level I      RESULTS      Fetus # 1 of 1   Vertex presentation   Fetal growth appeared normal   Placenta Location = Anterior   No placenta previa   Placenta Grade = II      MEASUREMENTS (* Included In Average GA)      AC              27 2 cm        31 weeks 2 days* (68%)   BPD              8 0 cm        31 weeks 6 days* (76%)   HC              29 4 cm        32 weeks 1 day * (73%)   Femur            5 7 cm        29 weeks 6 days* (42%)      NBL             12 4 mm      Cerebellum       3 7 cm        31 weeks 6 days      HC/AC           1 08   FL/AC           0 21   FL/BPD          0 72   EFW (Ac/Fl/Hc)  1684 grams - 3 lbs 11 oz                 (61%)      THE AVERAGE GESTATIONAL AGE is 31 weeks 2 days +/- 18 days  AMNIOTIC FLUID      Q1: 5 3      Q2: 3 3      Q3: 5 6      Q4: 3 5   HAIDER Total = 17 6 cm   Amniotic Fluid: Normal      ANATOMY DETAILS      Visualized Appearing Sonographically Normal:   HEAD: (Calvarium, BPD Level, Cavum, Lateral Ventricles, Choroid Plexus,   Cerebellum, Cisterna Magna);    FACE/NECK: (Profile);    TH  CAV :   (Diaphragm); HEART: (Four Chamber View, Proximal Left Outflow, Proximal   Right Outflow, 3VV, 3 Vessel Trachea, Short Axis of Greater Vessels,   Interventricular Septum, Interatrial Septum, Cardiac Axis, Cardiac   Position);    STOMACH, RIGHT KIDNEY, LEFT KIDNEY, BLADDER, PLACENTA, PCI      Not Visualized:   FACE/NECK: (Palate)      Abnormal:   UMBL  CORD      ANATOMY COMMENTS      The prior fetal anatomic survey was limited with respect to evaluation of   the palate, facial profile, nasal bone and cavum septum pellucidum  These   anatomic views were seen today as sonographically normal within the   inherent limitations of fetal ultrasound with the exception of the palate   which remains suboptimally imaged secondary to unfavorable fetal position  IMPRESSION      Matthew IUP   31 weeks and 2 days by this ultrasound  (THIAGO=MAR 19 2017)   30 weeks and 0 days by 1st Tri Sono  (THIAGO=MAR 28 2017)   Vertex presentation   Fetal growth appeared normal   Regular fetal heart rate of 126 bpm   Anterior placenta   No placenta previa      GENERAL COMMENT      An umbilical cord cyst near the placental cord insertion site measuring 32   x 27 x 29 mm in size is identified  No fetal structural abnormality is   identified on the Level I survey today  Fetal interval growth and   amniotic fluid volume are normal  The pallor remain suboptimally imaged  Today's ultrasound findings and suggested follow-up were discussed in   detail with Rosy  She will return to the [facility]  in late   February to assess interval growth   We have scheduled consultation with   Neonatology for Rosy to discuss matters related to  abstinence   syndrome given her history of Subutex use during this pregnancy  Daily   third trimester fetal kick counting was discussed at the visit today  The face to face time, in addition to time spent discussing ultrasound   results, was approximately 10 minutes, greater than 50% of which was spent   during counseling and coordination of care  Delila Bail, R D M S Olean Kanner, M D     Maternal-Fetal Medicine   Electronically signed 17 16:48

## 2018-01-22 VITALS — WEIGHT: 187 LBS | SYSTOLIC BLOOD PRESSURE: 118 MMHG | BODY MASS INDEX: 29.29 KG/M2 | DIASTOLIC BLOOD PRESSURE: 72 MMHG

## 2018-01-22 VITALS — WEIGHT: 180 LBS | BODY MASS INDEX: 28.19 KG/M2 | SYSTOLIC BLOOD PRESSURE: 122 MMHG | DIASTOLIC BLOOD PRESSURE: 74 MMHG

## 2018-01-22 VITALS — DIASTOLIC BLOOD PRESSURE: 70 MMHG | WEIGHT: 185 LBS | BODY MASS INDEX: 28.97 KG/M2 | SYSTOLIC BLOOD PRESSURE: 130 MMHG

## 2018-01-22 VITALS — WEIGHT: 182 LBS | DIASTOLIC BLOOD PRESSURE: 70 MMHG | SYSTOLIC BLOOD PRESSURE: 110 MMHG | BODY MASS INDEX: 28.5 KG/M2

## 2018-01-22 VITALS — BODY MASS INDEX: 29.76 KG/M2 | WEIGHT: 190 LBS | SYSTOLIC BLOOD PRESSURE: 118 MMHG | DIASTOLIC BLOOD PRESSURE: 78 MMHG

## 2018-03-07 NOTE — PROGRESS NOTES
Education  [employer] 3rd Trimester: Third Trimester Education provided:   benefits of breastfeeding, importance of exclusive breastfeeding, early initiation of breastfeeding, exclusive breastfeeding for the first 6 months, frequent feedings for optimal milk production, feeding on demand/baby-led feedings, effective positioning and attachment, importance of breastfeeding after 6 months following introduction of other foods, non-pharmacologic pain relief methods for labor, importance of early skin-to-skin contact and 28 week packet given  rooming-in on 24 hour basis Pregnancy Essentials Reference Guide given   The patient is not planning on breastfeeding  The patient is not planning on exclusively breastfeeding until the baby is 10months of age  Mother has not registered for prenatal class  Thought has been given to selecting a pediatrician  The mother has discussed personal preferences with her provider  Contraindication to breastfeeding identified: Subutex therapy     Prenatal education provided by: Kris Hare PA-C      Signatures   Electronically signed by : ROHAN Diop; Dec 22 2016  9:48AM EST                       (Author)

## 2018-11-25 ENCOUNTER — OFFICE VISIT (OUTPATIENT)
Dept: URGENT CARE | Age: 38
End: 2018-11-25
Payer: COMMERCIAL

## 2018-11-25 VITALS
DIASTOLIC BLOOD PRESSURE: 82 MMHG | TEMPERATURE: 99 F | SYSTOLIC BLOOD PRESSURE: 126 MMHG | HEIGHT: 68 IN | OXYGEN SATURATION: 97 % | RESPIRATION RATE: 16 BRPM | WEIGHT: 146 LBS | HEART RATE: 75 BPM | BODY MASS INDEX: 22.13 KG/M2

## 2018-11-25 DIAGNOSIS — K08.89 PAIN, DENTAL: Primary | ICD-10-CM

## 2018-11-25 PROCEDURE — 99213 OFFICE O/P EST LOW 20 MIN: CPT | Performed by: FAMILY MEDICINE

## 2018-11-25 RX ORDER — CLINDAMYCIN HYDROCHLORIDE 300 MG/1
300 CAPSULE ORAL 3 TIMES DAILY
Qty: 30 CAPSULE | Refills: 0 | Status: SHIPPED | OUTPATIENT
Start: 2018-11-25 | End: 2018-12-05

## 2018-11-25 NOTE — PATIENT INSTRUCTIONS
Take all antibiotics as prescribed  Call dentist tomorrow to see if her appointment could be moved up  Continue Tylenol/Motrin for pain    Go to the ED if any new or worsening symptoms

## 2018-11-25 NOTE — PROGRESS NOTES
C/O a broken lower molar for 2 months  Pain started worse past 2 days  Was initially intermittent, but now steadier  Needed a root canal, but insurance didn't cover it

## 2018-11-25 NOTE — PROGRESS NOTES
[facility] [employer] [facility]        NAME: Roman Rasmussen is a 41 y o  female  : 1980    MRN: [id number]  DATE: 2018  TIME: 6:17 PM    Assessment and Plan   Pain, dental [K08 89]  1  Pain, dental  clindamycin (CLEOCIN) 300 MG capsule         Patient Instructions       Take all antibiotics as prescribed  Call dentist tomorrow to see if her appointment could be moved up  Continue Tylenol/Motrin for pain  Go to the ED if any new or worsening symptoms    Chief Complaint     Chief Complaint   Patient presents with    Dental Injury         History of Present Illness       Patient states about 1 to 1/2 months ago she broke her left lower tooth  States she was  using Tylenol/Motrin for the pain which was helping  States uses have a root canal a few weeks ago but she could not afford it  States a few days ago she felt like a broken tooth got worse and food got stuck  Now she feels like it has infection and a Tylenol Motrin is not helping very much  Patient states she called her dentist but she can only get in for appointment in 3 weeks so they instructed her to go to urgent care for antibiotic  No facial swelling or difficulty swallowing  No fevers  Review of Systems   Review of Systems   Constitutional: Negative for chills and fever  HENT: Positive for dental problem  Negative for ear pain, facial swelling, sore throat, trouble swallowing and voice change  Eyes: Negative for visual disturbance  Respiratory: Negative for cough, shortness of breath and wheezing  Cardiovascular: Negative for chest pain  Gastrointestinal: Negative for abdominal pain, diarrhea, nausea and vomiting  Musculoskeletal: Negative for back pain  Skin: Negative for rash  Neurological: Negative for dizziness, numbness and headaches  All other systems reviewed and are negative          Current Medications       Current Outpatient Prescriptions:     buprenorphine (SUBUTEX) 2 mg, Place 8 mg under the tongue daily  , Disp: , Rfl:     clindamycin (CLEOCIN) 300 MG capsule, Take 1 capsule (300 mg total) by mouth 3 (three) times a day for 10 days, Disp: 30 capsule, Rfl: 0    ibuprofen (MOTRIN) 800 mg tablet, Take 1 tablet by mouth every 6 (six) hours as needed for mild pain for up to 10 days, Disp: 30 tablet, Rfl: 0    Current Allergies     Allergies as of 2018    (No Known Allergies)            The following portions of the patient's history were reviewed and updated as appropriate: allergies, current medications, past family history, past medical history, past social history, past surgical history and problem list      Past Medical History:   Diagnosis Date    Asthma     Heroin use     on subutex     Pregnancy complicated by subutex maintenance, antepartum ([facility] [address] )     Resolved:2016     (spontaneous vaginal delivery)     x 4       Past Surgical History:   Procedure Laterality Date    INDUCED        x 3       Family History   Problem Relation Age of Onset    Diabetes Mother     Diabetes Other     Breast cancer Other         malignant neoplasm         Medications have been verified  Objective   /82 (BP Location: Left arm, Patient Position: Sitting, Cuff Size: Standard)   Pulse 75   Temp 99 °F (37 2 °C) (Temporal)   Resp 16   Ht 5' 8" (1 727 m)   Wt 66 2 kg (146 lb)   LMP 2018   SpO2 97%   BMI 22 20 kg/m²        Physical Exam     Physical Exam   Constitutional: She is oriented to person, place, and time  She appears well-developed and well-nourished  No distress  HENT:   Head: Normocephalic and atraumatic  Mouth/Throat: Uvula is midline  Dental caries present  No uvula swelling  No posterior oropharyngeal erythema  No facial swelling or signs of cellulitis   Eyes: Pupils are equal, round, and reactive to light  Conjunctivae and EOM are normal    Neck: Normal range of motion  Neck supple     Cardiovascular: Normal rate, regular rhythm and normal heart sounds  Pulmonary/Chest: Effort normal and breath sounds normal  She has no wheezes  Musculoskeletal: Normal range of motion  Neurological: She is alert and oriented to person, place, and time  She has normal reflexes  Skin: Skin is warm and dry  No rash noted  No erythema  Psychiatric: She has a normal mood and affect  Her behavior is normal    Nursing note and vitals reviewed

## 2020-07-10 ENCOUNTER — HOSPITAL ENCOUNTER (EMERGENCY)
Facility: HOSPITAL | Age: 40
Discharge: HOME | End: 2020-07-10
Attending: EMERGENCY MEDICINE | Admitting: EMERGENCY MEDICINE
Payer: COMMERCIAL

## 2020-07-10 VITALS
HEART RATE: 77 BPM | HEIGHT: 67 IN | BODY MASS INDEX: 23.54 KG/M2 | RESPIRATION RATE: 18 BRPM | OXYGEN SATURATION: 99 % | WEIGHT: 150 LBS | DIASTOLIC BLOOD PRESSURE: 67 MMHG | SYSTOLIC BLOOD PRESSURE: 122 MMHG | TEMPERATURE: 98 F

## 2020-07-10 DIAGNOSIS — S05.02XA ABRASION OF LEFT CORNEA, INITIAL ENCOUNTER: Primary | ICD-10-CM

## 2020-07-10 PROCEDURE — 99283 EMERGENCY DEPT VISIT LOW MDM: CPT

## 2020-07-10 PROCEDURE — 99284 EMERGENCY DEPT VISIT MOD MDM: CPT | Performed by: EMERGENCY MEDICINE

## 2020-07-10 RX ORDER — TETRACAINE HYDROCHLORIDE 5 MG/ML
1 SOLUTION OPHTHALMIC ONCE
Status: COMPLETED | OUTPATIENT
Start: 2020-07-10 | End: 2020-07-10

## 2020-07-10 RX ORDER — ACETAMINOPHEN 325 MG/1
650 TABLET ORAL ONCE
Status: COMPLETED | OUTPATIENT
Start: 2020-07-10 | End: 2020-07-10

## 2020-07-10 RX ORDER — OXYCODONE HYDROCHLORIDE 10 MG/1
10 TABLET ORAL ONCE
Status: COMPLETED | OUTPATIENT
Start: 2020-07-10 | End: 2020-07-10

## 2020-07-10 RX ORDER — ERYTHROMYCIN 5 MG/G
OINTMENT OPHTHALMIC
Qty: 100 G | Refills: 0 | Status: SHIPPED | OUTPATIENT
Start: 2020-07-10 | End: 2021-08-09

## 2020-07-10 RX ORDER — ERYTHROMYCIN 5 MG/G
0.5 OINTMENT OPHTHALMIC ONCE
Status: COMPLETED | OUTPATIENT
Start: 2020-07-10 | End: 2020-07-10

## 2020-07-10 RX ORDER — CYCLOPENTOLATE HYDROCHLORIDE 10 MG/ML
2 SOLUTION/ DROPS OPHTHALMIC ONCE
Status: COMPLETED | OUTPATIENT
Start: 2020-07-10 | End: 2020-07-10

## 2020-07-10 RX ADMIN — FLUORESCEIN SODIUM 1 STRIP: 1 STRIP OPHTHALMIC at 03:48

## 2020-07-10 RX ADMIN — TETRACAINE HYDROCHLORIDE 1 DROP: 5 SOLUTION OPHTHALMIC at 03:07

## 2020-07-10 RX ADMIN — ACETAMINOPHEN 650 MG: 325 TABLET, FILM COATED ORAL at 03:07

## 2020-07-10 RX ADMIN — CYCLOPENTOLATE HYDROCHLORIDE 2 DROP: 10 SOLUTION/ DROPS OPHTHALMIC at 03:07

## 2020-07-10 RX ADMIN — OXYCODONE HYDROCHLORIDE 10 MG: 10 TABLET ORAL at 04:13

## 2020-07-10 RX ADMIN — ERYTHROMYCIN 0.5 INCH: 5 OINTMENT OPHTHALMIC at 04:13

## 2020-07-10 NOTE — DISCHARGE INSTRUCTIONS
Can take Tylenol 650 mg every 6 hours for pain, it is better not to take Advil, ibuprofen or Aleve for now    It is critically important follow-up with the ophthalmologist or return to the emergency department for recheck especially if your pain is not better in the next 1 day
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
## 2021-04-16 ENCOUNTER — HOSPITAL ENCOUNTER (EMERGENCY)
Facility: HOSPITAL | Age: 41
Discharge: HOME | End: 2021-04-16
Attending: EMERGENCY MEDICINE
Payer: COMMERCIAL

## 2021-04-16 VITALS
TEMPERATURE: 98.9 F | HEART RATE: 81 BPM | BODY MASS INDEX: 23.81 KG/M2 | WEIGHT: 152 LBS | OXYGEN SATURATION: 100 % | RESPIRATION RATE: 18 BRPM

## 2021-04-16 DIAGNOSIS — K04.7 DENTAL INFECTION: Primary | ICD-10-CM

## 2021-04-16 PROCEDURE — 99284 EMERGENCY DEPT VISIT MOD MDM: CPT | Performed by: EMERGENCY MEDICINE

## 2021-04-16 PROCEDURE — 99282 EMERGENCY DEPT VISIT SF MDM: CPT

## 2021-04-16 PROCEDURE — 96372 THER/PROPH/DIAG INJ SC/IM: CPT

## 2021-04-16 RX ORDER — KETOROLAC TROMETHAMINE 30 MG/ML
30 INJECTION, SOLUTION INTRAMUSCULAR; INTRAVENOUS ONCE
Status: COMPLETED | OUTPATIENT
Start: 2021-04-16 | End: 2021-04-16

## 2021-04-16 RX ORDER — AMOXICILLIN AND CLAVULANATE POTASSIUM 875; 125 MG/1; MG/1
1 TABLET, FILM COATED ORAL EVERY 12 HOURS
Qty: 14 TABLET | Refills: 0 | Status: SHIPPED | OUTPATIENT
Start: 2021-04-16 | End: 2021-04-23

## 2021-04-16 RX ORDER — DICLOFENAC SODIUM 25 MG/1
50 TABLET, DELAYED RELEASE ORAL 2 TIMES DAILY PRN
Qty: 14 TABLET | Refills: 0 | Status: SHIPPED | OUTPATIENT
Start: 2021-04-16 | End: 2021-08-09

## 2021-04-16 RX ORDER — AMOXICILLIN AND CLAVULANATE POTASSIUM 875; 125 MG/1; MG/1
1 TABLET, FILM COATED ORAL ONCE
Status: COMPLETED | OUTPATIENT
Start: 2021-04-16 | End: 2021-04-16

## 2021-04-16 RX ADMIN — AMOXICILLIN AND CLAVULANATE POTASSIUM 1 TABLET: 875; 125 TABLET, FILM COATED ORAL at 09:05

## 2021-04-16 RX ADMIN — KETOROLAC TROMETHAMINE 30 MG: 30 INJECTION, SOLUTION INTRAMUSCULAR at 09:06

## 2021-04-16 NOTE — ED PROVIDER NOTES
History  Chief Complaint   Patient presents with    Dental Pain     72-year-old female presenting with left upper dental pain  Patient has ongoing poor dentition and she has not followed up with Dentistry due to ongoing [place] pandemic  She is an active smoker  She does have a list of dentists she can call that accepts her insurance but she has not yet called  Prior to Admission Medications   Prescriptions Last Dose Informant Patient Reported? Taking? buprenorphine (SUBUTEX) 2 mg  Self Yes No   Sig: Place 8 mg under the tongue daily     erythromycin (ILOTYCIN) ophthalmic ointment   No No   Sig: Place a 1/2 inch ribbon of ointment into the lower eyelid and blink to distribute it through the eye 4 times a day for the next 7 days   ibuprofen (MOTRIN) 800 mg tablet   No No   Sig: Take 1 tablet by mouth every 6 (six) hours as needed for mild pain for up to 10 days      Facility-Administered Medications: None       Past Medical History:   Diagnosis Date    Asthma     Heroin use     on subutex     Pregnancy complicated by subutex maintenance, antepartum ([facility] [address] )     Resolved:2016     (spontaneous vaginal delivery)     x 4       Past Surgical History:   Procedure Laterality Date    INDUCED        x 3       Family History   Problem Relation Age of Onset    Diabetes Mother     Diabetes Other     Breast cancer Other         malignant neoplasm     I have reviewed and agree with the history as documented  E-Cigarette/Vaping     E-Cigarette/Vaping Substances     Social History     Tobacco Use    Smoking status: Current Some Day Smoker     Packs/day: 0 50     Types: Cigarettes    Smokeless tobacco: Never Used    Tobacco comment: smokes 1-10 cigs per day   Substance Use Topics    Alcohol use: No     Comment: occasionaly    Drug use: No       Review of Systems   Constitutional: Negative for fever  HENT: Positive for dental problem  Eyes: Negative for visual disturbance     Respiratory: Negative for shortness of breath  Cardiovascular: Negative for chest pain  Gastrointestinal: Negative for abdominal pain  Musculoskeletal: Negative for neck pain  Skin: Negative for rash  Neurological: Negative for weakness  Psychiatric/Behavioral: The patient is not nervous/anxious  Physical Exam  Physical Exam  Vitals signs and nursing note reviewed  Constitutional:       General: She is not in acute distress  HENT:      Head: Normocephalic and atraumatic  Mouth/Throat:      Pharynx: No posterior oropharyngeal erythema  Comments: Dental caries with a fractured left upper molar with gingival edema  No gingival abscess palpated  Eyes:      Conjunctiva/sclera: Conjunctivae normal    Neck:      Musculoskeletal: No neck rigidity  Cardiovascular:      Rate and Rhythm: Regular rhythm  Pulmonary:      Effort: Pulmonary effort is normal  No respiratory distress  Abdominal:      General: There is no distension  Skin:     General: Skin is warm and dry  Neurological:      Mental Status: She is alert  Mental status is at baseline     Psychiatric:         Mood and Affect: Mood normal          Vital Signs  ED Triage Vitals [04/16/21 0853]   Temperature Pulse Respirations BP SpO2   98 9 °F (37 2 °C) 81 18 -- 100 %      Temp Source Heart Rate Source Patient Position - Orthostatic VS BP Location FiO2 (%)   Oral -- -- -- --      Pain Score       --           Vitals:    04/16/21 0853   Pulse: 81         Visual Acuity      ED Medications  Medications   ketorolac (TORADOL) injection 30 mg (30 mg Intramuscular Given 4/16/21 0906)   amoxicillin-clavulanate (AUGMENTIN) 875-125 mg per tablet 1 tablet (1 tablet Oral Given 4/16/21 0905)       Diagnostic Studies  Results Reviewed     None                 No orders to display              Procedures  Procedures         ED Course                             SBIRT 22yo+      Most Recent Value   SBIRT (25 yo +)   In order to provide better care to our patients, we are screening all of our patients for alcohol and drug use  Would it be okay to ask you these screening questions? Yes Filed at: 04/16/2021 0900   Initial Alcohol Screen: US AUDIT-C    1  How often do you have a drink containing alcohol?  0 Filed at: 04/16/2021 0900   2  How many drinks containing alcohol do you have on a typical day you are drinking? 0 Filed at: 04/16/2021 0900   3a  Male UNDER 65: How often do you have five or more drinks on one occasion? 0 Filed at: 04/16/2021 0900   3b  FEMALE Any Age, or MALE 65+: How often do you have 4 or more drinks on one occassion? 0 Filed at: 04/16/2021 0900   Audit-C Score  0 Filed at: 04/16/2021 0900   LINDA: How many times in the past year have you    Used an illegal drug or used a prescription medication for non-medical reasons? Never Filed at: 04/16/2021 0900                    MDM  Number of Diagnoses or Management Options  Dental infection:   Diagnosis management comments: 82-year-old female presenting with dental infection  She does have dental caries and poor dentition  She has a fractured left upper molar with evidence of surrounding gingival infection  There is no obvious palpable gingival abscess  Patient placed on Augmentin  We discussed cessation of smoking  She states she has a list of dentists she can call at home to set up an expedited follow-up      Disposition  Final diagnoses:   Dental infection     Time reflects when diagnosis was documented in both MDM as applicable and the Disposition within this note     Time User Action Codes Description Comment    4/16/2021  9:02 AM Tegan Cash Add [K04 7] Dental infection       ED Disposition     ED Disposition Condition Date/Time Comment    Discharge Stable Fri Apr 16, 2021  9:02 AM Nima Morris discharge to home/self care              Follow-up Information    None         Discharge Medication List as of 4/16/2021  9:04 AM      START taking these medications    Details amoxicillin-clavulanate (AUGMENTIN) 875-125 mg per tablet Take 1 tablet by mouth every 12 (twelve) hours for 7 days, Starting Fri 4/16/2021, Until Fri 4/23/2021, Normal      diclofenac (VOLTAREN) 25 MG EC tablet Take 2 tablets (50 mg total) by mouth 2 (two) times a day as needed (pain), Starting Fri 4/16/2021, Normal         CONTINUE these medications which have NOT CHANGED    Details   buprenorphine (SUBUTEX) 2 mg Place 8 mg under the tongue daily  , Until Discontinued, Historical Med      erythromycin (ILOTYCIN) ophthalmic ointment Place a 1/2 inch ribbon of ointment into the lower eyelid and blink to distribute it through the eye 4 times a day for the next 7 days, Normal      ibuprofen (MOTRIN) 800 mg tablet Take 1 tablet by mouth every 6 (six) hours as needed for mild pain for up to 10 days, Starting 4/5/2017, Until Sat 4/15/17, Normal           No discharge procedures on file      PDMP Review     None          ED Provider  Electronically Signed by           Blake Mohan DO  04/16/21 1489

## 2021-04-16 NOTE — DISCHARGE INSTRUCTIONS
Please return if you develop any worsening symptoms  You may return at any time if you have any further concerns    Please refer to the dental list at home to find a dentist to be seen as soon as possible

## 2021-04-16 NOTE — ED TRIAGE NOTES
C/o left lower dental pain  States has had dental issues for years  Pt refused blood pressure in triage  States everything hurts   MD aware

## 2021-08-09 ENCOUNTER — HOSPITAL ENCOUNTER (EMERGENCY)
Facility: HOSPITAL | Age: 41
Discharge: HOME | End: 2021-08-09
Attending: EMERGENCY MEDICINE | Admitting: EMERGENCY MEDICINE
Payer: COMMERCIAL

## 2021-08-09 VITALS
TEMPERATURE: 98.9 F | DIASTOLIC BLOOD PRESSURE: 98 MMHG | SYSTOLIC BLOOD PRESSURE: 155 MMHG | RESPIRATION RATE: 17 BRPM | HEART RATE: 81 BPM | WEIGHT: 152 LBS | HEIGHT: 67 IN | OXYGEN SATURATION: 100 % | BODY MASS INDEX: 23.86 KG/M2

## 2021-08-09 DIAGNOSIS — S00.512A ABRASION OF ORAL CAVITY, INITIAL ENCOUNTER: ICD-10-CM

## 2021-08-09 DIAGNOSIS — J02.9 PHARYNGITIS: Primary | ICD-10-CM

## 2021-08-09 LAB — S PYO DNA THROAT QL NAA+PROBE: NORMAL

## 2021-08-09 PROCEDURE — 99284 EMERGENCY DEPT VISIT MOD MDM: CPT | Performed by: EMERGENCY MEDICINE

## 2021-08-09 PROCEDURE — 99283 EMERGENCY DEPT VISIT LOW MDM: CPT

## 2021-08-09 PROCEDURE — 87651 STREP A DNA AMP PROBE: CPT | Performed by: EMERGENCY MEDICINE

## 2021-08-09 RX ORDER — PREDNISONE 10 MG/1
50 TABLET ORAL DAILY
Qty: 25 TABLET | Refills: 0 | Status: SHIPPED | OUTPATIENT
Start: 2021-08-09 | End: 2021-08-14

## 2021-08-09 RX ORDER — IBUPROFEN 600 MG/1
600 TABLET ORAL ONCE
Status: COMPLETED | OUTPATIENT
Start: 2021-08-09 | End: 2021-08-09

## 2021-08-09 RX ADMIN — PREDNISONE 50 MG: 20 TABLET ORAL at 18:33

## 2021-08-09 RX ADMIN — IBUPROFEN 600 MG: 600 TABLET, FILM COATED ORAL at 18:33

## 2021-08-09 NOTE — ED PROVIDER NOTES
History  Chief Complaint   Patient presents with    Sore Throat     pt presents to ed via walk in with complaint of having left side dental pain also with a sore throat      This is a 46year old female who presents to the ED with a sore throat  She states it began yesterday  First she had a sore on the L side of her tongue  She thinks it is from her chipped tooth  She states that her throat began to hurt and that swallowing makes it worse  The patient is able to tolerate her own saliva  She denies chest pain or trouble breathing  She denies trouble opening or closing her mouth  She denies fevers or chills  She denies a rash  None       Past Medical History:   Diagnosis Date    Asthma     Heroin use     on subutex     Pregnancy complicated by subutex maintenance, antepartum ([facility] [address] )     Resolved:2016     (spontaneous vaginal delivery)     x 4       Past Surgical History:   Procedure Laterality Date    INDUCED        x 3       Family History   Problem Relation Age of Onset    Diabetes Mother     Diabetes Other     Breast cancer Other         malignant neoplasm     I have reviewed and agree with the history as documented  E-Cigarette/Vaping    E-Cigarette Use Never User      E-Cigarette/Vaping Substances     Social History     Tobacco Use    Smoking status: Current Some Day Smoker     Packs/day: 0 50     Types: Cigarettes    Smokeless tobacco: Never Used    Tobacco comment: smokes 1-10 cigs per day   Vaping Use    Vaping Use: Never used   Substance Use Topics    Alcohol use: No     Comment: occasionaly    Drug use: No       Review of Systems   All other systems reviewed and are negative        Physical Exam  Physical Exam  Constitutional:  Vital signs reviewed, patient appears nontoxic, no acute distress  Eyes: Pupils equal round reactive to light and accommodation, extraocular muscles intact  HEENT: trachea midline, no JVD, moist mucous membranes,uvula midline, no exudate, no tonsillar swelling  Full ROM at the jaw  Respiratory: lung sounds clear throughout, no rhonchi, no rales  Cardiovascular: regular rate rhythm, no murmurs or rubs  Abdomen: soft, nontender, nondistended, no rebound or guarding  Back: no CVA tenderness, normal inspection  Extremities: no edema, pulses equal in all 4 extremities  Neuro: awake, alert, oriented, no focal weakness  Skin: warm, dry, intact, no rashes noted    Vital Signs  ED Triage Vitals [08/09/21 1823]   Temperature Pulse Respirations Blood Pressure SpO2   98 9 °F (37 2 °C) 81 17 155/98 100 %      Temp Source Heart Rate Source Patient Position - Orthostatic VS BP Location FiO2 (%)   Oral Monitor Sitting Right arm --      Pain Score       --           Vitals:    08/09/21 1823   BP: 155/98   Pulse: 81   Patient Position - Orthostatic VS: Sitting         Visual Acuity      ED Medications  Medications   ibuprofen (MOTRIN) tablet 600 mg (600 mg Oral Given 8/9/21 1833)   predniSONE tablet 50 mg (50 mg Oral Given 8/9/21 1833)       Diagnostic Studies  Results Reviewed     Procedure Component Value Units Date/Time    Strep A PCR [[id number]]  (Normal) Collected: 08/09/21 1833    Lab Status: Final result Specimen: Throat Updated: 08/09/21 2023     STREP A PCR None Detected                 No orders to display              Procedures  Procedures         ED Course                             SBIRT 22yo+      Most Recent Value   SBIRT (23 yo +)   In order to provide better care to our patients, we are screening all of our patients for alcohol and drug use  Would it be okay to ask you these screening questions? No Filed at: 08/09/2021 1835   Initial Alcohol Screen: US AUDIT-C    1  How often do you have a drink containing alcohol?  0 Filed at: 08/09/2021 1835   2  How many drinks containing alcohol do you have on a typical day you are drinking? 0 Filed at: 08/09/2021 1835   3a  Male UNDER 65: How often do you have five or more drinks on one occasion?   0 Filed at: 08/09/2021 1835   3b  FEMALE Any Age, or MALE 65+: How often do you have 4 or more drinks on one occassion? 0 Filed at: 08/09/2021 1835   Audit-C Score  0 Filed at: 08/09/2021 1835                    [facility]  Number of Diagnoses or Management Options  Abrasion of oral cavity, initial encounter  Pharyngitis  Diagnosis management comments: This is a 46year old female who presents to the ED with a sore throat  I considered strep, phyengitis, viral illness  These and other diagnoses were considered  The patient was well appearing on exam  She was not tachycardic or febrile  She has findings on OP exam  She will be dc with strep pending and advised we will call her in the AM with the result  Amount and/or Complexity of Data Reviewed  Clinical lab tests: ordered        Disposition  Final diagnoses:   Pharyngitis   Abrasion of oral cavity, initial encounter     Time reflects when diagnosis was documented in both MDM as applicable and the Disposition within this note     Time User Action Codes Description Comment    8/9/2021  6:28 PM Gerri Gordon Add [J02 9] Pharyngitis     8/9/2021  6:28 PM Ciera March Add [S00 512A] Abrasion of oral cavity, initial encounter       ED Disposition     ED Disposition Condition Date/Time Comment    Discharge Stable Mon Aug 9, 2021  6:29 PM Oma Huang discharge to home/self care              Follow-up Information     Follow up With Specialties Details Why Contact Info Additional [address]  Emergency Department Emergency Medicine  If symptoms worsen [address] [facility],[address] [id number]  [address] [place] [address] Emergency Department, [address], [address] [place] [address]    [employer]  In 2 days For a PCP appointment [phone]             Discharge Medication List as of 8/9/2021  6:29 PM      START taking these medications    Details   predniSONE 10 mg tablet Take 5 tablets (50 mg total) by mouth daily for 5 days, Starting Mon 8/9/2021, Until Sat 8/14/2021, Normal         STOP taking these medications       diclofenac (VOLTAREN) 25 MG EC tablet Comments:   Reason for Stopping:         erythromycin (ILOTYCIN) ophthalmic ointment Comments:   Reason for Stopping:         ibuprofen (MOTRIN) 800 mg tablet Comments:   Reason for Stopping:             No discharge procedures on file      PDMP Review     None          ED Provider  Electronically Signed by           Paras Michael DO  08/10/21 0124

## 2021-08-10 ENCOUNTER — APPOINTMENT (EMERGENCY)
Dept: RADIOLOGY | Facility: HOSPITAL | Age: 41
End: 2021-08-10

## 2021-08-10 ENCOUNTER — HOSPITAL ENCOUNTER (EMERGENCY)
Facility: HOSPITAL | Age: 41
Discharge: HOME | End: 2021-08-10
Payer: COMMERCIAL

## 2021-08-10 VITALS
DIASTOLIC BLOOD PRESSURE: 81 MMHG | WEIGHT: 152 LBS | BODY MASS INDEX: 23.86 KG/M2 | SYSTOLIC BLOOD PRESSURE: 114 MMHG | HEIGHT: 67 IN | OXYGEN SATURATION: 100 % | RESPIRATION RATE: 18 BRPM | TEMPERATURE: 99 F | HEART RATE: 86 BPM

## 2021-08-10 DIAGNOSIS — S83.92XA SPRAIN OF LEFT KNEE, UNSPECIFIED LIGAMENT, INITIAL ENCOUNTER: Primary | ICD-10-CM

## 2021-08-10 PROCEDURE — 99284 EMERGENCY DEPT VISIT MOD MDM: CPT

## 2021-08-10 PROCEDURE — 73564 X-RAY EXAM KNEE 4 OR MORE: CPT

## 2021-08-10 PROCEDURE — 99283 EMERGENCY DEPT VISIT LOW MDM: CPT

## 2021-08-10 RX ORDER — IBUPROFEN 600 MG/1
600 TABLET ORAL ONCE
Status: COMPLETED | OUTPATIENT
Start: 2021-08-10 | End: 2021-08-10

## 2021-08-10 RX ORDER — NAPROXEN 500 MG/1
500 TABLET ORAL 2 TIMES DAILY WITH MEALS
Qty: 30 TABLET | Refills: 0 | Status: SHIPPED | OUTPATIENT
Start: 2021-08-10

## 2021-08-10 RX ADMIN — IBUPROFEN 600 MG: 600 TABLET, FILM COATED ORAL at 11:17

## 2021-08-10 NOTE — ED PROVIDER NOTES
History  Chief Complaint   Patient presents with    Leg Pain     patient jumped over a fence in the rain this morning and she slipped, injuring her left knee     30-year-old female history of asthma no other major medical problems here secondary to jumping over a fence to catch her dog this morning landed awkwardly on her left knee  Patient states her left knee bent in an awkward direction now she is having pain  Patient having pain with walking having pain with straightening out her knee  Patient denies any other injury denies hitting her head denies any other extremity injury  Patient denies any foot or ankle pain denies any hip pain all in her posterior knee region  Prior to Admission Medications   Prescriptions Last Dose Informant Patient Reported? Taking?   predniSONE 10 mg tablet   No No   Sig: Take 5 tablets (50 mg total) by mouth daily for 5 days      Facility-Administered Medications: None       Past Medical History:   Diagnosis Date    Asthma     Heroin use     on subutex     Pregnancy complicated by subutex maintenance, antepartum ([facility] [address] )     Resolved:2016     (spontaneous vaginal delivery)     x 4       Past Surgical History:   Procedure Laterality Date    INDUCED        x 3       Family History   Problem Relation Age of Onset    Diabetes Mother     Diabetes Other     Breast cancer Other         malignant neoplasm     I have reviewed and agree with the history as documented  E-Cigarette/Vaping    E-Cigarette Use Never User      E-Cigarette/Vaping Substances     Social History     Tobacco Use    Smoking status: Current Some Day Smoker     Packs/day: 0 50     Types: Cigarettes    Smokeless tobacco: Never Used    Tobacco comment: smokes 1-10 cigs per day   Vaping Use    Vaping Use: Never used   Substance Use Topics    Alcohol use: No     Comment: occasionaly    Drug use: No       Review of Systems   Constitutional: Negative for chills and fever     HENT: Medications  Medications   ibuprofen (MOTRIN) tablet 600 mg (600 mg Oral Given 8/10/21 1117)       Diagnostic Studies  Results Reviewed     None                 XR knee 4+ vw left injury   ED Interpretation by Desirae Marroquin MD (08/10 1156)   No acute fracture or dislocation  Procedures  Procedures         ED Course                                           [facility]  Number of Diagnoses or Management Options  Diagnosis management comments: X-ray of left knee demonstrates no fracture no dislocation patella is intact  Patient was placed in a knee brace patient be discharged home on naproxen 500 mg twice a day as needed for pain ice rest patient given follow-up with Dr Daily troyo was ortho on call symptoms not improve 1 week      Disposition  Final diagnoses:   Sprain of left knee, unspecified ligament, initial encounter     Time reflects when diagnosis was documented in both MDM as applicable and the Disposition within this note     Time User Action Codes Description Comment    8/10/2021 11:57 AM Erick Lopez Add Rhys Chicas  92XA] Sprain of left knee, unspecified ligament, initial encounter       ED Disposition     ED Disposition Condition Date/Time Comment    Discharge Stable Tue Aug 10, 2021 11:56 AM Ximena Klein discharge to home/self care              Follow-up Information     Follow up With Specialties Details Why Contact Ulises Norton,  Orthopedic Surgery, Hand Surgery Schedule an appointment as soon as possible for a visit in 3 days If symptoms worsen [address] [phone]            Discharge Medication List as of 8/10/2021 11:57 AM      START taking these medications    Details   naproxen (NAPROSYN) 500 mg tablet Take 1 tablet (500 mg total) by mouth 2 (two) times a day with meals, Starting Tue 8/10/2021, Normal         CONTINUE these medications which have NOT CHANGED    Details   predniSONE 10 mg tablet Take 5 tablets (50 mg total) by mouth daily for 5 days, Starting Mon 8/9/2021, Until Sat 8/14/2021, Normal           No discharge procedures on file      PDMP Review     None          ED Provider  Electronically Signed by           Denise Wynn MD  08/10/21 2022 Radha Mai MD  08/10/21 [id number]

## 2021-08-10 NOTE — ED NOTES
Patient arguing with significant other on the phone at this time        Christos Arias RN  08/10/21 7454

## 2021-09-26 ENCOUNTER — HOSPITAL ENCOUNTER (EMERGENCY)
Facility: HOSPITAL | Age: 41
Discharge: HOME | End: 2021-09-26
Attending: EMERGENCY MEDICINE | Admitting: EMERGENCY MEDICINE
Payer: COMMERCIAL

## 2021-09-26 VITALS
OXYGEN SATURATION: 99 % | HEIGHT: 67 IN | HEART RATE: 86 BPM | TEMPERATURE: 98.5 F | RESPIRATION RATE: 18 BRPM | WEIGHT: 154.1 LBS | BODY MASS INDEX: 24.19 KG/M2 | DIASTOLIC BLOOD PRESSURE: 60 MMHG | SYSTOLIC BLOOD PRESSURE: 122 MMHG

## 2021-09-26 DIAGNOSIS — R21 LOCALIZED PUSTULAR ERUPTION: Primary | ICD-10-CM

## 2021-09-26 PROCEDURE — 99284 EMERGENCY DEPT VISIT MOD MDM: CPT | Performed by: EMERGENCY MEDICINE

## 2021-09-26 PROCEDURE — 99282 EMERGENCY DEPT VISIT SF MDM: CPT

## 2021-09-26 RX ORDER — DOXYCYCLINE HYCLATE 100 MG/1
100 CAPSULE ORAL 2 TIMES DAILY
Qty: 14 CAPSULE | Refills: 0 | Status: SHIPPED | OUTPATIENT
Start: 2021-09-26 | End: 2021-10-03

## 2021-10-02 ENCOUNTER — HOSPITAL ENCOUNTER (EMERGENCY)
Facility: HOSPITAL | Age: 41
Discharge: HOME | End: 2021-10-02
Payer: COMMERCIAL

## 2021-10-02 VITALS
SYSTOLIC BLOOD PRESSURE: 113 MMHG | HEART RATE: 85 BPM | RESPIRATION RATE: 17 BRPM | OXYGEN SATURATION: 99 % | DIASTOLIC BLOOD PRESSURE: 67 MMHG | TEMPERATURE: 97.6 F

## 2021-10-02 DIAGNOSIS — J06.9 URI (UPPER RESPIRATORY INFECTION): ICD-10-CM

## 2021-10-02 DIAGNOSIS — Z20.822 SUSPECTED COVID-19 VIRUS INFECTION: Primary | ICD-10-CM

## 2021-10-02 DIAGNOSIS — R43.0 ANOSMIA: ICD-10-CM

## 2021-10-02 LAB — SARS-COV-2 RNA RESP QL NAA+PROBE: NEGATIVE

## 2021-10-02 PROCEDURE — 99284 EMERGENCY DEPT VISIT MOD MDM: CPT | Performed by: PHYSICIAN ASSISTANT

## 2021-10-02 PROCEDURE — 99283 EMERGENCY DEPT VISIT LOW MDM: CPT

## 2021-10-02 PROCEDURE — 87635 SARS-COV-2 COVID-19 AMP PRB: CPT | Performed by: PHYSICIAN ASSISTANT

## 2023-03-18 ENCOUNTER — OFFICE VISIT (OUTPATIENT)
Dept: URGENT CARE | Age: 43
End: 2023-03-18

## 2023-03-18 VITALS
SYSTOLIC BLOOD PRESSURE: 132 MMHG | HEIGHT: 68 IN | BODY MASS INDEX: 25.76 KG/M2 | RESPIRATION RATE: 18 BRPM | DIASTOLIC BLOOD PRESSURE: 80 MMHG | TEMPERATURE: 97.8 F | WEIGHT: 170 LBS | OXYGEN SATURATION: 98 % | HEART RATE: 71 BPM

## 2023-03-18 DIAGNOSIS — N89.8 VAGINAL DISCHARGE: Primary | ICD-10-CM

## 2023-03-18 LAB
SL AMB  POCT GLUCOSE, UA: NEGATIVE
SL AMB LEUKOCYTE ESTERASE,UA: ABNORMAL
SL AMB POCT BILIRUBIN,UA: NEGATIVE
SL AMB POCT BLOOD,UA: NEGATIVE
SL AMB POCT CLARITY,UA: ABNORMAL
SL AMB POCT COLOR,UA: YELLOW
SL AMB POCT KETONES,UA: NEGATIVE
SL AMB POCT NITRITE,UA: NEGATIVE
SL AMB POCT PH,UA: 7.5
SL AMB POCT SPECIFIC GRAVITY,UA: 1.01
SL AMB POCT URINE HCG: NEGATIVE
SL AMB POCT URINE PROTEIN: NEGATIVE
SL AMB POCT UROBILINOGEN: 0.2

## 2023-03-18 NOTE — PROGRESS NOTES
[address]        NAME: Devan Nava is a 38 y o  female  : 1980    MRN: [id number]  DATE: 2023  TIME: 6:18 PM      Assessment and Plan     Vaginal discharge [N89 8]  1  Vaginal discharge  POCT urine dip    POCT urine HCG    Chlamydia/GC amplified DNA by PCR    Urine culture    Transfer to other facility        poct hcg negative  poct urine dip shows moderate amount of leukocytes  Urine culture sent  [address] sent  Patient agreeable to proceed to the ER for further evaluation  Requesting to go to [facility]    Patient Instructions     Proceed to the ER for further evaluation  Chief Complaint     Chief Complaint   Patient presents with   • Vaginal Discharge     Vaginal discharge/discomfort/odor x 2 weeks         History of Present Illness     Patient is a 47-year-old female who presents with vaginal discharge for the past 2-1/2 weeks  Reports vaginal itching  States that it smells like garbage and fish  Denies abdominal pain  Denies nausea, vomiting, or diarrhea  Denies fever or chills  States that she did had some vaginal spotting that did resolve  Reports she [facility]  States that she had similar symptoms a year ago and they treated her for an STI but she is unsure which one  States she does have an OB/GYN but has not been there in approximately 4 years  States she is only had intercourse with 1 partner since symptoms developed and states that he is asymptomatic  Review of Systems     Review of Systems   Constitutional: Negative for chills and fever  Gastrointestinal: Negative for abdominal pain, diarrhea, nausea and vomiting  Genitourinary: Positive for vaginal discharge  Negative for flank pain, frequency, pelvic pain, urgency and vaginal bleeding  Skin: Negative for rash  All other systems reviewed and are negative          Current Medications       Current Outpatient Medications:   •  naproxen (NAPROSYN) 500 mg tablet, Take 1 tablet (500 mg total) by mouth 2 (two) times a day with meals (Patient not taking: Reported on 3/18/2023), Disp: 30 tablet, Rfl: 0    Current Allergies     Allergies as of 2023   • (No Known Allergies)              The following portions of the patient's history were reviewed and updated as appropriate: allergies, current medications, past family history, past medical history, past social history, past surgical history and problem list      Past Medical History:   Diagnosis Date   • Asthma    • Heroin use     on subutex    • Pregnancy complicated by subutex maintenance, antepartum ([facility] [address] )     Resolved:2016   •  (spontaneous vaginal delivery)     x 4       Past Surgical History:   Procedure Laterality Date   • INDUCED        x 3       Family History   Problem Relation Age of Onset   • Diabetes Mother    • Diabetes Other    • Breast cancer Other         malignant neoplasm         Medications have been verified  Objective     /80   Pulse 71   Temp 97 8 °F (36 6 °C)   Resp 18   Ht 5' 8" (1 727 m)   Wt 77 1 kg (170 lb)   SpO2 98%   BMI 25 85 kg/m²   No LMP recorded  Physical Exam     Physical Exam  Vitals and nursing note reviewed  Constitutional:       General: She is awake  She is not in acute distress  Appearance: Normal appearance  She is not ill-appearing, toxic-appearing or diaphoretic  HENT:      Mouth/Throat:      Lips: Pink  Mouth: Mucous membranes are moist       Pharynx: Oropharynx is clear  Uvula midline  No oropharyngeal exudate or posterior oropharyngeal erythema  Cardiovascular:      Rate and Rhythm: Normal rate  Pulses: Normal pulses  Heart sounds: Normal heart sounds, S1 normal and S2 normal    Pulmonary:      Effort: Pulmonary effort is normal       Breath sounds: Normal breath sounds and air entry  Abdominal:      General: Abdomen is flat   Bowel sounds are normal       Palpations: Abdomen is soft       Tenderness: There is no abdominal tenderness  There is no right CVA tenderness or left CVA tenderness  Skin:     General: Skin is warm  Capillary Refill: Capillary refill takes less than 2 seconds  Neurological:      Mental Status: She is alert  Psychiatric:         Mood and Affect: Mood normal          Behavior: Behavior normal          Thought Content:  Thought content normal          Judgment: Judgment normal

## 2023-03-19 LAB — BACTERIA UR CULT: NORMAL

## 2023-03-20 LAB
C TRACH DNA SPEC QL NAA+PROBE: NEGATIVE
N GONORRHOEA DNA SPEC QL NAA+PROBE: NEGATIVE

## 2023-09-10 ENCOUNTER — HOSPITAL ENCOUNTER (EMERGENCY)
Facility: HOSPITAL | Age: 43
Discharge: HOME | End: 2023-09-10
Attending: EMERGENCY MEDICINE | Admitting: EMERGENCY MEDICINE
Payer: MEDICARE

## 2023-09-10 VITALS
TEMPERATURE: 97.9 F | OXYGEN SATURATION: 99 % | SYSTOLIC BLOOD PRESSURE: 105 MMHG | RESPIRATION RATE: 18 BRPM | HEART RATE: 88 BPM | DIASTOLIC BLOOD PRESSURE: 53 MMHG

## 2023-09-10 DIAGNOSIS — N93.9 VAGINAL BLEEDING: ICD-10-CM

## 2023-09-10 DIAGNOSIS — O20.0 THREATENED MISCARRIAGE IN EARLY PREGNANCY: Primary | ICD-10-CM

## 2023-09-10 PROCEDURE — 99283 EMERGENCY DEPT VISIT LOW MDM: CPT

## 2023-09-10 PROCEDURE — 99284 EMERGENCY DEPT VISIT MOD MDM: CPT | Performed by: EMERGENCY MEDICINE

## 2023-09-10 NOTE — ED ATTENDING ATTESTATION
9/10/2023  [facility], saw and evaluated the patient. I have discussed the patient with the resident/non-physician practitioner and agree with the resident's/non-physician practitioner's findings, Plan of Care, and MDM as documented in the resident's/non-physician practitioner's note, except where noted. All available labs and Radiology studies were reviewed. I was present for key portions of any procedure(s) performed by the resident/non-physician practitioner and I was immediately available to provide assistance. At this point I agree with the current assessment done in the Emergency Department. I have conducted an independent evaluation of this patient a history and physical is as follows:    42 yo female [address] presents for vaginal bleeding, possible passage of POC. Currently not having any pain but still bleeding. Went through 3 pads in 1 hour PTA. No lightheadedness, dyspnea, chest pain. Had a confirmatory US around 8 WGA. POCUS transabdominal shows empty uterus with endometrial blood. Speculum exam attempted but pt unable to tolerate due to vaginal wall pain from speculum. Unable to visualize if POC within cervical os. Tolerated bimanual exam, cervix felt to be closed. No obvious mass felt within os. Imp: spontaneous ab in 1st trimester plan: consider misoprostol as she is <12wk. Recommend quant to trend to 0 but pt does not want a quant because she doesn't like needles. Encourage follow up with OB as outpt.       ED Course         Critical Care Time  Procedures

## 2023-09-11 NOTE — DISCHARGE INSTRUCTIONS
Please return to the ED if you have fever, severe abdominal pain, or are soaking through more than 1 heavy pad per hour.

## 2023-09-11 NOTE — ED PROVIDER NOTES
History  Chief Complaint   Patient presents with   • Vaginal Bleeding     Pt 11 wks pregnant and is having large amounts of bleeding and clotting. Patient is a 65-year-old female presenting at approximately 11 weeks gestation for evaluation of vaginal bleeding. Patient states that yesterday, she began spotting. However, today bleeding became more heavy and she began to pass large clots. States she soaked through approximately 3 pads in 2 hours prior to coming to the ED. Endorsing mild abdominal cramping. Denying chest pain, shortness of breath, nausea, vomiting, fever, or chills  Patient has had intrauterine pregnancy confirmed outpatient office. Took Midol at home for symptomatic relief. Prior to Admission Medications   Prescriptions Last Dose Informant Patient Reported? Taking?   naproxen (NAPROSYN) 500 mg tablet   No No   Sig: Take 1 tablet (500 mg total) by mouth 2 (two) times a day with meals   Patient not taking: Reported on 3/18/2023      Facility-Administered Medications: None       Past Medical History:   Diagnosis Date   • Asthma    • Heroin use     on subutex    • Pregnancy complicated by subutex maintenance, antepartum ([address])     Resolved:2016   •  (spontaneous vaginal delivery)     x 4       Past Surgical History:   Procedure Laterality Date   • INDUCED        x 3       Family History   Problem Relation Age of Onset   • Diabetes Mother    • Diabetes Other    • Breast cancer Other         malignant neoplasm     I have reviewed and agree with the history as documented. E-Cigarette/Vaping   • E-Cigarette Use Never User      E-Cigarette/Vaping Substances     Social History     Tobacco Use   • Smoking status: Some Days     Packs/day: 0.50     Types: Cigarettes   • Smokeless tobacco: Never   • Tobacco comments:     smokes 1-10 cigs per day   Vaping Use   • Vaping Use: Never used   Substance Use Topics   • Alcohol use: No     Comment: occasionaly   • Drug use:  No Review of Systems   Constitutional: Negative for chills and fever. HENT: Sore throat: cramping. Respiratory: Negative for shortness of breath. Cardiovascular: Negative for chest pain. Gastrointestinal: Negative for abdominal pain, nausea and vomiting. Genitourinary: Positive for pelvic pain and vaginal bleeding. Neurological: Negative for light-headedness and headaches. Physical Exam  ED Triage Vitals [09/10/23 1822]   Temperature Pulse Respirations Blood Pressure SpO2   97.9 °F (36.6 °C) 100 18 130/58 100 %      Temp src Heart Rate Source Patient Position - Orthostatic VS BP Location FiO2 (%)   -- -- Lying Right arm --      Pain Score       --             Orthostatic Vital Signs  Vitals:    09/10/23 1822 09/10/23 1922   BP: 130/58 105/53   Pulse: 100 88   Patient Position - Orthostatic VS: Lying        Physical Exam  Exam conducted with a chaperone present. Constitutional:       General: She is not in acute distress. Appearance: Normal appearance. She is not ill-appearing, toxic-appearing or diaphoretic. HENT:      Head: Normocephalic and atraumatic. Cardiovascular:      Rate and Rhythm: Normal rate and regular rhythm. Pulses: Normal pulses. Heart sounds: Normal heart sounds. Pulmonary:      Effort: Pulmonary effort is normal.      Breath sounds: Normal breath sounds. Abdominal:      General: Abdomen is flat. Bowel sounds are normal.      Palpations: Abdomen is soft. Tenderness: There is no abdominal tenderness. Genitourinary:     General: Normal vulva. Exam position: Lithotomy position. Vagina: Bleeding present. Cervix: Cervical bleeding present. Uterus: Not fixed and not tender. Comments: Unable to visual os as patient did not tolerate speculum exam    Musculoskeletal:         General: No swelling. Normal range of motion. Cervical back: Normal range of motion and neck supple. Right lower leg: No edema.       Left lower leg: No edema.   Skin:     General: Skin is warm and dry. Capillary Refill: Capillary refill takes less than 2 seconds. Neurological:      General: No focal deficit present. Mental Status: She is alert. Psychiatric:         Mood and Affect: Mood normal.         Behavior: Behavior normal.         Thought Content: Thought content normal.         Judgment: Judgment normal.         ED Medications  Medications - No data to display    Diagnostic Studies  Results Reviewed     None                 No orders to display         Procedures  POC Pelvic US    Date/Time: 9/11/2023 1:52 AM    Performed by: Molly Pete MD  Authorized by: Molly Pete MD    Patient location:  ED  Procedure performed by consultant: Dr. Jana Glasgow. Procedure details:     Exam Type:  Diagnostic    Indications: evaluate for IUP and pregnant with vaginal bleeding      Assessment for: confirm intrauterine pregnancy and evaluate fetal viability      Technique:  Transabdominal obstetric (HCG+) exam    Views obtained: uterus (transverse and sagittal)      Image quality: diagnostic      Image availability:  Not saved  Uterine findings:     Intrauterine pregnancy: not identified      Single gestation: not identified      Multiple gestation: not identified      Gestational sac: not identified      Yolk sac: not identified      Fetal pole: not identified      Fetal heart rate: not identified    Interpretation:     Ultrasound impressions: abnormal    Comments:      Concern for threatened vs. Completed miscarriage             ED Course                             SBIRT 22yo+    Flowsheet Row Most Recent Value   Initial Alcohol Screen: US AUDIT-C     1. How often do you have a drink containing alcohol? 0 Filed at: 09/10/2023 1824   2. How many drinks containing alcohol do you have on a typical day you are drinking? 0 Filed at: 09/10/2023 1824   3a. Male UNDER 65: How often do you have five or more drinks on one occasion?  0 Filed at: 09/10/2023    3b. FEMALE Any Age, or MALE 65+: How often do you have 4 or more drinks on one occassion? 0 Filed at: 09/10/2023 1824   Audit-C Score 0 Filed at: 09/10/2023 1824   LINDA: How many times in the past year have you. .. Used an illegal drug or used a prescription medication for non-medical reasons? Never Filed at: 09/10/2023 1824                Medical Decision Making  Parvin Nettles is a 41 y.o. who presents with complaints of vaginal bleeding and passage of large clots. Currently approx. 11 weeks pregnant with IUP confirmed at outpatient appointment. Vital signs are stable, afebrile   physical exam shows vaginal blood in posterior fornix. Os unable to be visualized as patient did not tolerate speculum exam.     Ddx: 1st trimester bleeding vs. Threatened vs. Completed     Plan:  Pelvic US- intrauterine clots bleeding without IUP visualized  Patient refusing bHCG    Disposition: Patient stable for discharge home. Explained that heavy vaginal bleeding with absence of intrauterine pregnancy on bedside ultrasound despite prior IUP confirmation likely indicating completed miscarriage. Expectant management counseling provided. Recommend follow-up with OB/GYN within the next 3 days for reevaluation. Patient understands and is agreeable to plan. Return precautions provided.             Disposition  Final diagnoses:   Vaginal bleeding   Threatened miscarriage in early pregnancy     Time reflects when diagnosis was documented in both MDM as applicable and the Disposition within this note     Time User Action Codes Description Comment    9/10/2023  8:08 PM Leatha Angel Add [N93.9] Vaginal bleeding     9/10/2023  8:08 PM Shreya Ellis Add [O20.0] Threatened miscarriage in early pregnancy     9/10/2023  8:08 PM Leatha Angel Modify [N93.9] Vaginal bleeding     9/10/2023  8:08 PM Shreya Ellis Modify [O20.0] Threatened miscarriage in early pregnancy       ED Disposition     ED Disposition Discharge    Condition   Stable    Date/Time   Sun Sep 10, 2023  8:10 PM    Comment   Ramesh Cyr discharge to home/self care. Follow-up Information     Follow up With Specialties Details Why Contact Info    Mumtaz Pate MD Obstetrics and Gynecology, Obstetrics, Gynecology Go in 3 days For reevaluation [address]  [phone]            Discharge Medication List as of 9/10/2023  8:10 PM      CONTINUE these medications which have NOT CHANGED    Details   naproxen (NAPROSYN) 500 mg tablet Take 1 tablet (500 mg total) by mouth 2 (two) times a day with meals, Starting Tue 8/10/2021, Normal           No discharge procedures on file. PDMP Review     None           ED Provider  Attending physically available and evaluated Ramesh Cyr. I managed the patient along with the ED Attending.     Electronically Signed by         Jeovany Kim MD  09/11/23 0157

## 2024-06-17 ENCOUNTER — TELEPHONE (OUTPATIENT)
Dept: OBGYN CLINIC | Facility: CLINIC | Age: 44
End: 2024-06-17

## 2024-06-17 NOTE — TELEPHONE ENCOUNTER
Got a call from the access center to schedule an appt for Rosy who is about 11 weeks and has not been here since 2017.  When I was speaking with Rosy and trying to schedule an appointment the call dropped.  Called Rosy back and left voice message to call back to schedule appt.

## 2024-08-23 ENCOUNTER — TELEPHONE (OUTPATIENT)
Age: 44
End: 2024-08-23

## 2024-08-23 NOTE — TELEPHONE ENCOUNTER
Patient's LMP is 4/1/24, missed last dating ultrasound appointment due to emergency. Scheduled for 9/6/24. If patient needs to be seen sooner, contact number on file to assist with scheduling.

## 2024-08-23 NOTE — TELEPHONE ENCOUNTER
Lmom letting pt know I scheduled her for 8/28 in [facility] at 8am with Dr. Cui and to call back if unable to come

## 2024-09-03 NOTE — PROGRESS NOTES
"S: 44 y.o.  who presents for viability scan with LMP of 24. She is 22 weeks and 4 days by her LMP. Periods are regular. THIAGO based on LMP: 24. She reports mild swelling of lower extremities. She is smoking; she has decreased from 1 ppd to 0.5 ppd.  She denies cramping or vaginal bleeding. This is a surprise pregnancy, but is now welcomed.  Her previous pregnancies were on uncomplicated, except for subutex. She is currently off subutex.      Past Medical History:   Diagnosis Date    Asthma     Heroin use     on subutex     Pregnancy complicated by subutex maintenance, antepartum (HCC)     Resolved:2016     (spontaneous vaginal delivery)     x 4       OB History    Para Term  AB Living   10 5 5 0 3 1   SAB IAB Ectopic Multiple Live Births   0 0 0 0 1      # Outcome Date GA Lbr Meet/2nd Weight Sex Type Anes PTL Lv   10 Current            9 Term 17 39w6d / 00:04 4085 g (9 lb 0.1 oz) F Vag-Spont EPI N MARIAH   8             7 AB            6 AB            5 AB            4 Term            3 Term            2 Term            1 Term                 O:  Vitals:    24 1434   Weight: 95.7 kg (211 lb)   Height: 5' 8\" (1.727 m)            A/P:  1. Viable pregnancy on US.   2. MFM referral placed.  3. Prenatal blood work ordered.  4. Last pap: .  5. Pap and STI cxs collected today.  6. PE completed today.  7. RTC in 2 week for nurse intake visit        Problem List Items Addressed This Visit    None  Visit Diagnoses       Amenorrhea    -  Primary    Relevant Orders    [facility] US OB 14 + weeks single or first gestation level 2 (Completed)    22 weeks gestation of pregnancy        Relevant Orders    Ambulatory Referral to Maternal Fetal Medicine    CBC and differential    Hepatitis B surface antigen    Hepatitis C antibody    Hgb Fractionation Cascade    HIV 1/2 AG/AB w Reflex [facility] for 2 yr old and above    Rubella antibody, IgG    RPR-Syphilis Screening (Total Syphilis " IGG/IGM)    TSH, 3rd generation    T4, free    Urinalysis with microscopic    Urine culture    Varicella zoster antibody, IgG    Type and screen    Thinprep Tis and HPV mRNA E6/E7    Chlamydia/GC amplified DNA by PCR    Trichomonas Vaginalis, SUZETTE

## 2024-09-06 ENCOUNTER — ULTRASOUND (OUTPATIENT)
Dept: OBGYN CLINIC | Facility: CLINIC | Age: 44
End: 2024-09-06
Payer: MEDICARE

## 2024-09-06 VITALS — WEIGHT: 211 LBS | BODY MASS INDEX: 31.98 KG/M2 | HEIGHT: 68 IN

## 2024-09-06 DIAGNOSIS — Z3A.22 22 WEEKS GESTATION OF PREGNANCY: ICD-10-CM

## 2024-09-06 DIAGNOSIS — N91.2 AMENORRHEA: Primary | ICD-10-CM

## 2024-09-06 PROCEDURE — 99214 OFFICE O/P EST MOD 30 MIN: CPT

## 2024-09-06 RX ORDER — CALCIUM CARBONATE 500 MG/1
1 TABLET, CHEWABLE ORAL DAILY
COMMUNITY

## 2024-09-07 LAB
C TRACH RRNA SPEC QL NAA+PROBE: NOT DETECTED
N GONORRHOEA RRNA SPEC QL NAA+PROBE: NOT DETECTED
SL AMB T VAGINALIS, QL TMA, PAP VIAL: NOT DETECTED

## 2024-09-09 ENCOUNTER — TELEPHONE (OUTPATIENT)
Age: 44
End: 2024-09-09

## 2024-09-09 ENCOUNTER — TELEPHONE (OUTPATIENT)
Facility: HOSPITAL | Age: 44
End: 2024-09-09

## 2024-09-10 LAB
C TRACH RRNA SPEC QL NAA+PROBE: NOT DETECTED
CLINICAL INFO: NORMAL
CYTO CVX: NORMAL
CYTOLOGY CMNT CVX/VAG CYTO-IMP: NORMAL
DATE PREVIOUS BX: NORMAL
HPV E6+E7 MRNA CVX QL NAA+PROBE: NOT DETECTED
LMP START DATE: NORMAL
N GONORRHOEA RRNA SPEC QL NAA+PROBE: NOT DETECTED
SL AMB PREV. PAP:: NORMAL
SL AMB T VAGINALIS, QL TMA, PAP VIAL: NOT DETECTED
SPECIMEN SOURCE CVX/VAG CYTO: NORMAL

## 2024-09-18 ENCOUNTER — INITIAL PRENATAL (OUTPATIENT)
Dept: OBGYN CLINIC | Facility: CLINIC | Age: 44
End: 2024-09-18
Payer: MEDICARE

## 2024-09-18 ENCOUNTER — TELEPHONE (OUTPATIENT)
Dept: OBGYN CLINIC | Facility: CLINIC | Age: 44
End: 2024-09-18

## 2024-09-18 VITALS
DIASTOLIC BLOOD PRESSURE: 74 MMHG | HEIGHT: 68 IN | BODY MASS INDEX: 32.28 KG/M2 | SYSTOLIC BLOOD PRESSURE: 120 MMHG | WEIGHT: 213 LBS

## 2024-09-18 DIAGNOSIS — Z34.82 PRENATAL CARE, SUBSEQUENT PREGNANCY, SECOND TRIMESTER: Primary | ICD-10-CM

## 2024-09-18 PROCEDURE — T1001 NURSING ASSESSMENT/EVALUATN: HCPCS

## 2024-09-18 NOTE — PROGRESS NOTES
OB INTAKE INTERVIEW  Patient is 44 y.o. who presents for OB intake at 24 wks 2 days  She is accompanied by Arjun  during this encounter  The father of her baby (Arjun Hernandez) is involved in the pregnancy and is 44 years old - FOB age 14 yr old daughter, 7 yr old son      Last Menstrual Period: 2024  Ultrasound: Measured 23 weeks 1 days on 2024  Estimated Date of Delivery: 2025 confirmed by US    Signs/Symptoms of Pregnancy  Current pregnancy symptoms: heartburn (taking Tums, recommended can try Pepcid), urinary frequency  Constipation no  Headaches YES (not migraine- type) - recommended Tylenol prn  Cramping/spotting no  PICA cravings no    Diabetes-  There is no height or weight on file to calculate BMI.  If patient has 1 or more, please order early 1 hour GTT  History of GDM no  BMI >35 no  History of PCOS or current metformin use no  History of LGA/macrosomic infant (4000g/9lbs) YES    If patient has 2 or more, please order early 1 hour GTT  BMI>30 YES  AMA YES  First degree relative with type 2 diabetes no  History of chronic HTN, hyperlipidemia, elevated A1C no  High risk race (, , ,  or ) no    Hypertension- if you answer yes to any of the following, please order baseline preeclampsia labs (cbc, comprehensive metabolic panel, urine protein creatinine ratio, uric acid)  History of of chronic HTN no  History of gestational HTN no  History of preeclampsia, eclampsia, or HELLP syndrome no  History of diabetes no  History of lupus, autoimmune disease, kidney disease no    Thyroid- if yes order TSH with reflex T4  History of thyroid disease no    Bleeding Disorder or Hx of DVT-patient or first degree relative with history of. Order the following if not done previously.   (Factor V, antithrombin III, prothrombin gene mutation, protein C and S Ag, lupus anticoagulant, anticardiolipin, beta-2 glycoprotein)   no    OB/GYN-  History of  abnormal pap smear YES (age 26 or 27 - states she had colposcopy - no tx)      Date of last pap smear 2024 (wnl (-) hpv)  History of HPV no  History of Herpes/HSV no - FOB has hx HSV 2 (no outbreaks for years)  History of other STI (gonorrhea, chlamydia, trich) YES (hx chlamydia - treated)  History of prior  YES  History of prior  no  History of  delivery prior to 36 weeks 6 days no  History of Varicella or Vaccination patient had chickenpox  History of blood transfusion no  Ok for blood transfusion no    Substance screening-   History of tobacco use YES (since age 16)  Currently using tobacco YES (currently 10 cig/day - previously 1 ppd)  Substance Use Screen Level (N/A, LOW, HIGH) low risk    MRSA Screening-   Does the pt have a hx of MRSA? YES (3 yr ago - treated)    Immunizations:  Influenza vaccine given this season NO - recommended in pregnancy  Discussed Tdap vaccine YES  Discussed COVID Vaccine - patient had Covid vaccine x 1 (J&J), patient had Covid x 3    Genetic/M-  Do you or your partner have a history of any of the following in yourselves or first degree relatives?  Cystic fibrosis no  Spinal muscular atrophy no  Hemoglobinopathy/Sickle Cell/Thalassemia no  Fragile X Intellectual Disability no    If yes, discuss Carrier Screening and recommend consultation with [facility]/Genetic Counseling and place specific [facility] Referral for.    If no, discuss Carrier Screening being completed once in a lifetime as a standard of care lab test. Place orders for Cystic Fibrosis Gene Test ([id number]) and Spinal Muscular Atrophy DNA ([id number])  - discussed - patient will decide if wants done for lab order    Appointment for Nuchal Translucency Ultrasound at [facility] scheduled for - has appointment for detailed US @ [facility] 2025 - discussed NIPT      Interview education  [facility] Pregnancy Essentials Book reviewed, discussed and attached to their AVS YES    Nurse/Family Partnership- patient may qualify; referral  placed no    Prenatal lab work scripts YES  Extra labs ordered:  1 hr glucose (will follow up with OB provider re: if should have done now or @ 26-28 wk)    Aspirin/Preeclampsia Screen    Risk Level Risk Factor Recommendation   LOW Prior Uncomplicated full-term delivery YES No Aspirin recommendation        MODERATE Nulliparity no Recommend low-dose aspirin if     BMI>30 YES 2 or more moderate risk factors    Family History Preeclampsia (mother/sister) no     35yr old or greater YES     Black Race, Concern for SDOH/Low Socioeconomic no     IVF Pregnancy  no     Personal History Risks (low birth weight, prior adverse preg outcome, >10yr preg interval) YES (9 lb + x 2 pregs, IAB x 2, SAB x 1)         HIGH History of Preeclampsia no Recommend low-dose aspirin if     Multifetal gestation no 1 or more high risk factors    Chronic HTN no     Type 1 or 2 Diabetes no     Renal Disease no     Autoimmune Disease  no      Contraindications to ASA therapy:  NSAID/ ASA allergy: no  Nasal polyps: no  Asthma with history of ASA induced bronchospasm: no  Relative contraindications:  History of GI bleed: no  Active peptic ulcer disease: no  Severe hepatic dysfunction: no    Patient should be recommended to take ASA 162mg during this pregnancy from 12-36 wks to lower her risk of preeclampsia: will follow up with provider @ OB appointment as patient is 24 wk 2 days      The patient has a history now or in prior pregnancy notable for:    - late prenatal care @ 23 wks  - hx SAB x 1, IAB x  2  - hx 9 lb babies x 2  - smoker - currently smoking 1/2 ppd (previously 1 ppd)  - hx Subutex use - not x 7 yrs (hx heroin use)       Details that I feel the provider should be aware of:     - this is 2nd pregnancy with FOB (also last preg loss)    PN1 visit scheduled. The patient was oriented to our practice, the navigator role, reviewed delivering physicians and [facility] for Delivery. All questions were answered.    Interviewed by: JAYASHREE Jauregui RN

## 2024-09-18 NOTE — TELEPHONE ENCOUNTER
LMOM for patient to call us back to reschedule her OB Intake Appointment due to kiet running late to today's intake appointment that was scheduled for 3pm in [place] office.

## 2024-09-19 ENCOUNTER — ROUTINE PRENATAL (OUTPATIENT)
Dept: OBGYN CLINIC | Facility: CLINIC | Age: 44
End: 2024-09-19
Payer: MEDICARE

## 2024-09-19 VITALS
BODY MASS INDEX: 32.43 KG/M2 | HEIGHT: 68 IN | SYSTOLIC BLOOD PRESSURE: 118 MMHG | WEIGHT: 214 LBS | DIASTOLIC BLOOD PRESSURE: 76 MMHG

## 2024-09-19 DIAGNOSIS — O99.330 TOBACCO USE IN PREGNANCY, ANTEPARTUM: ICD-10-CM

## 2024-09-19 DIAGNOSIS — O26.892 HEARTBURN DURING PREGNANCY IN SECOND TRIMESTER: Primary | ICD-10-CM

## 2024-09-19 DIAGNOSIS — Z3A.25 25 WEEKS GESTATION OF PREGNANCY: ICD-10-CM

## 2024-09-19 DIAGNOSIS — Z87.898 HISTORY OF SUBSTANCE USE DISORDER: ICD-10-CM

## 2024-09-19 DIAGNOSIS — O09.522 MULTIGRAVIDA OF ADVANCED MATERNAL AGE IN SECOND TRIMESTER: ICD-10-CM

## 2024-09-19 DIAGNOSIS — Z20.828 EXPOSURE TO HERPES SIMPLEX VIRUS (HSV): ICD-10-CM

## 2024-09-19 DIAGNOSIS — R12 HEARTBURN DURING PREGNANCY IN SECOND TRIMESTER: Primary | ICD-10-CM

## 2024-09-19 PROBLEM — O09.529 AMA (ADVANCED MATERNAL AGE) MULTIGRAVIDA 35+: Status: ACTIVE | Noted: 2024-09-19

## 2024-09-19 PROCEDURE — 99213 OFFICE O/P EST LOW 20 MIN: CPT | Performed by: STUDENT IN AN ORGANIZED HEALTH CARE EDUCATION/TRAINING PROGRAM

## 2024-09-19 RX ORDER — FAMOTIDINE 20 MG/1
20 TABLET, FILM COATED ORAL DAILY
Qty: 30 TABLET | Refills: 0 | Status: SHIPPED | OUTPATIENT
Start: 2024-09-19 | End: 2024-10-19

## 2024-09-19 RX ORDER — ASPIRIN 81 MG/1
81 TABLET, CHEWABLE ORAL DAILY
Qty: 30 TABLET | Refills: 2 | Status: SHIPPED | OUTPATIENT
Start: 2024-09-19 | End: 2024-12-18

## 2024-09-19 NOTE — ASSESSMENT & PLAN NOTE
H/o remote opioid use and subutex maintenance.   Denies current drug use.   Unable to leave urine specimen for UDS today

## 2024-09-19 NOTE — ASSESSMENT & PLAN NOTE
Rosy presents today for her 24 week [facility] visit.  She is currently 24w3d.  Vitals:    09/19/24 1500   BP: 118/76       GDM: I have counseled the patient regarding the importance of diagnosing gestational diabetes and maintaining good blood sugar control during pregnancy to decrease the risks associated with the disease.  I have discussed the symptoms of pre-eclampsia

## 2024-09-19 NOTE — PROGRESS NOTES
Assessment/Plan  Problem List          Behavioral Health    Tobacco use in pregnancy, antepartum    Current Assessment & Plan     Smoking half pack per day.     We also briefly discussed the association of tobacco use in pregnancy as an independent risk factor for adverse  outcomes including fetal growth restriction and  delivery.    We discussed smoking cessation, including behavioral modification, and in some cases, pharmacotherapy. Patient declines          History of substance use disorder    Current Assessment & Plan     H/o remote opioid use and subutex maintenance.   Denies current drug use.   Unable to leave urine specimen for UDS today             Obstetrics/Gynecology     (spontaneous vaginal delivery)    25 weeks gestation of pregnancy    Overview     [ ] prenatal labs   [ ] genetic testing   [ ] anatomy scan   [ ] glucola          Current Assessment & Plan       Rosy presents today for her 24 week [facility] visit.  She is currently 24w3d.  Vitals:    24 1500   BP: 118/76       GDM: I have counseled the patient regarding the importance of diagnosing gestational diabetes and maintaining good blood sugar control during pregnancy to decrease the risks associated with the disease.  I have discussed the symptoms of pre-eclampsia             AMA (advanced maternal age) multigravida 35+    Overview     - interested in genetic testing, [facility] referral previously placed   - aspirin 81mg              Other    Exposure to herpes simplex virus (HSV)    Overview     Partner has h/o genital outbreaks, none recent          Current Assessment & Plan     HSV serologic testing ordered           Other Visit Diagnoses       Heartburn during pregnancy in second trimester    -  Primary              Subjective    Rosy is a 44 y.o. female,  with an Estimated Date of Delivery: 25 with a current gestational age of 24w3d. Patient reports no complaints. Fetal movement: active.     Has not completed  genetic testing, prenatal labs, and anatomy scan. Patient encouraged to complete recommended testing. We discussed her high risk pregnancy and importance of diagnosing any maternal or fetal conditions.     History  The following portions of the patient's history were reviewed and updated as appropriate: current medications, past medical history, past social history, past surgical history and problem list.    Objective  Vitals:    09/19/24 1500   BP: 118/76     FHT: 150  FH: 23

## 2024-09-19 NOTE — ASSESSMENT & PLAN NOTE
Smoking half pack per day.     We also briefly discussed the association of tobacco use in pregnancy as an independent risk factor for adverse  outcomes including fetal growth restriction and  delivery.    We discussed smoking cessation, including behavioral modification, and in some cases, pharmacotherapy. Patient declines

## 2024-09-24 NOTE — PATIENT INSTRUCTIONS
You elected to have non-invasive prenatal screening (NIPS). This involves a blood test to check for the four most common genetic syndromes (Trisomy 21, Trisomy 13, Trisomy 18, and sex chromosome abnormalities). It also MAY report the biologic sex of the fetus if you opted to learn this information. You can call our office to verbally review results to avoid inadvertently learning this information via [employer], if desired. Results will be visible in your [employer] portal 7-10 business days from when the test is drawn. Please follow all instructions regarding insurance cost/coverage provided to you today. Please contact our office with any concerns or questions. You will need spina bifida screening (called MSAFP) for the baby beginning at 15 weeks gestation, which will be ordered by your obstetrician's office. This test allows for earlier detection of spina bifida than is possible by ultrasound, and is advised in all pregnancies.      Thank you for choosing us for your  care today.  If you have any questions about your ultrasound or care, please do not hesitate to contact us or your primary obstetrician.        Some general instructions for your pregnancy are:    Exercise: Aim for 150 minutes per week of regular exercise.  Walking is great!  Nutrition: Choose healthy sources of calcium, iron, and protein.  Avoid ultraprocessed foods and added sugar.  Learn about Preeclampsia: preeclampsia is a common, potentially serious high blood pressure complication in pregnancy.  A blood pressure of 140mmHg (systolic or top number) or 90mmHg (diastolic or bottom number) should be evaluated by your doctor.  Aspirin is sometimes prescribed in early pregnancy to prevent preeclampsia in women with risk factors - ask your obstetrician if you should be on this medication.  For more resources, visit:  https://[email or web address].org/preeclampsia  If you smoke, please try to quit completely but also try to reduce your smoking  by as much as possible (as soon as possible).  Do not vape.  Please also avoid cannabis products.  Other warning signs to watch out for in pregnancy or postpartum: chest pain, obstructed breathing or shortness of breath, seizures, thoughts of hurting yourself or your baby, bleeding, a painful or swollen leg, fever, or headache (see [facility] POST-BIRTH Warning Signs campaign).  If these happen call 911.  Itching is also not normal in pregnancy and if you experience this, especially over your hands and feet, potentially worse at night, notify your doctors.

## 2024-09-25 ENCOUNTER — ROUTINE PRENATAL (OUTPATIENT)
Age: 44
End: 2024-09-25
Payer: MEDICARE

## 2024-09-25 VITALS
HEIGHT: 68 IN | WEIGHT: 217.8 LBS | DIASTOLIC BLOOD PRESSURE: 74 MMHG | SYSTOLIC BLOOD PRESSURE: 124 MMHG | BODY MASS INDEX: 33.01 KG/M2 | HEART RATE: 102 BPM

## 2024-09-25 DIAGNOSIS — Z3A.25 25 WEEKS GESTATION OF PREGNANCY: Primary | ICD-10-CM

## 2024-09-25 DIAGNOSIS — O99.330 TOBACCO USE IN PREGNANCY, ANTEPARTUM: ICD-10-CM

## 2024-09-25 DIAGNOSIS — O09.522 MULTIGRAVIDA OF ADVANCED MATERNAL AGE IN SECOND TRIMESTER: ICD-10-CM

## 2024-09-25 DIAGNOSIS — Z87.898 HISTORY OF SUBSTANCE USE DISORDER: ICD-10-CM

## 2024-09-25 DIAGNOSIS — Z3A.22 22 WEEKS GESTATION OF PREGNANCY: ICD-10-CM

## 2024-09-25 PROCEDURE — 76811 OB US DETAILED SNGL FETUS: CPT | Performed by: OBSTETRICS & GYNECOLOGY

## 2024-09-25 PROCEDURE — 99244 OFF/OP CNSLTJ NEW/EST MOD 40: CPT | Performed by: OBSTETRICS & GYNECOLOGY

## 2024-09-25 PROCEDURE — 76817 TRANSVAGINAL US OBSTETRIC: CPT | Performed by: OBSTETRICS & GYNECOLOGY

## 2024-09-25 NOTE — LETTER
"2024    Josiane Sosa, THALIA  [address] [place] [address]    Patient: Rosy Peres   YOB: 1980   Date of Visit: 2024   Gestational age 25w2d   Nature of this communication: Routine       Dear Josiane Sosa,    This patient was seen recently in our  office.  The content of my evaluation today is in the ultrasound report under \"OB Procedures\" tab.     Please don't hesitate to contact our office with any concerns or questions.     Sincerely,      Jes Garcia MD  Attending Physician, Maternal-Fetal Medicine  [facility]      "

## 2024-09-25 NOTE — PROGRESS NOTES
Ultrasound Probe Disinfection    A transvaginal ultrasound was performed.   Prior to use, disinfection was performed with High Level Disinfection Process ([employer]).  Probe serial number S2: [id number] was used.    Sherrill TENA, DAYNE  09/25/24  8:27 AM

## 2024-09-25 NOTE — PROGRESS NOTES
Patient chose to have [facility] [id number] Non-Invasive Prenatal Screen [id number] PLUS w/ SCA, WITH fetal sex.  Patient choose to be billed through insurance.     Patient given brochure and is aware [facility] will contact patient's insurance and coordinate coverage.  Provided [facility] contact information. General inquiries [phone], Cost estimates [phone] and [facility] Billing [phone]. Website [email or web address].     Blood collection tubes labeled with patient identifiers (name, medical record number, and date of birth).     Filled out [facility] order form. Patient chose to be sent to an outpatient lab to complete blood work. .      If patient chose to have blood work drawn at a [facility] lab we requested patient notify MFM (via phone call or Flock message) when blood collected so office can follow up on results.       Maternal Fetal Medicine will have results in approximately 5-7 business days and will call patient or notify via Flock.  Patient aware viewing lab result online will reveal fetal sex if ordered.    Patient verbalized understanding of all instructions and no questions at this time.

## 2024-09-25 NOTE — PROGRESS NOTES
"[facility]: Roys Peres was seen today for anatomic survey ultrasound.  See ultrasound report under \"OB Procedures\" tab.   Please don't hesitate to contact our office with any concerns or questions.  -Jes Garcia MD      "

## 2024-10-09 ENCOUNTER — APPOINTMENT (OUTPATIENT)
Dept: LAB | Facility: CLINIC | Age: 44
End: 2024-10-09
Payer: MEDICARE

## 2024-10-09 DIAGNOSIS — Z34.82 PRENATAL CARE, SUBSEQUENT PREGNANCY, SECOND TRIMESTER: ICD-10-CM

## 2024-10-09 DIAGNOSIS — Z20.828 EXPOSURE TO HERPES SIMPLEX VIRUS (HSV): ICD-10-CM

## 2024-10-09 DIAGNOSIS — Z3A.22 22 WEEKS GESTATION OF PREGNANCY: ICD-10-CM

## 2024-10-09 LAB
ABO GROUP BLD: NORMAL
AMORPH URATE CRY URNS QL MICRO: ABNORMAL
BACTERIA UR QL AUTO: ABNORMAL /HPF
BILIRUB UR QL STRIP: NEGATIVE
BLD GP AB SCN SERPL QL: NEGATIVE
CLARITY UR: CLEAR
COLOR UR: ABNORMAL
GLUCOSE 1H P 50 G GLC PO SERPL-MCNC: 103 MG/DL (ref 70–134)
GLUCOSE UR STRIP-MCNC: NEGATIVE MG/DL
HGB UR QL STRIP.AUTO: NEGATIVE
KETONES UR STRIP-MCNC: NEGATIVE MG/DL
LEUKOCYTE ESTERASE UR QL STRIP: NEGATIVE
MUCOUS THREADS UR QL AUTO: ABNORMAL
NITRITE UR QL STRIP: NEGATIVE
NON-SQ EPI CELLS URNS QL MICRO: ABNORMAL /HPF
PH UR STRIP.AUTO: 6.5 [PH]
PROT UR STRIP-MCNC: ABNORMAL MG/DL
RBC #/AREA URNS AUTO: ABNORMAL /HPF
RH BLD: POSITIVE
RUBV IGG SERPL IA-ACNC: 30.4 IU/ML
SP GR UR STRIP.AUTO: 1.02 (ref 1–1.03)
SPECIMEN EXPIRATION DATE: NORMAL
T4 FREE SERPL-MCNC: 0.43 NG/DL (ref 0.61–1.12)
TSH SERPL DL<=0.05 MIU/L-ACNC: 0.8 UIU/ML (ref 0.45–4.5)
UROBILINOGEN UR STRIP-ACNC: <2 MG/DL
WBC #/AREA URNS AUTO: ABNORMAL /HPF

## 2024-10-09 PROCEDURE — 86762 RUBELLA ANTIBODY: CPT

## 2024-10-09 PROCEDURE — 84439 ASSAY OF FREE THYROXINE: CPT

## 2024-10-09 PROCEDURE — 86850 RBC ANTIBODY SCREEN: CPT

## 2024-10-09 PROCEDURE — 84443 ASSAY THYROID STIM HORMONE: CPT

## 2024-10-09 PROCEDURE — 36415 COLL VENOUS BLD VENIPUNCTURE: CPT

## 2024-10-09 PROCEDURE — 87086 URINE CULTURE/COLONY COUNT: CPT

## 2024-10-09 PROCEDURE — 86901 BLOOD TYPING SEROLOGIC RH(D): CPT

## 2024-10-09 PROCEDURE — 86787 VARICELLA-ZOSTER ANTIBODY: CPT

## 2024-10-09 PROCEDURE — 82950 GLUCOSE TEST: CPT

## 2024-10-09 PROCEDURE — 86900 BLOOD TYPING SEROLOGIC ABO: CPT

## 2024-10-09 PROCEDURE — 86780 TREPONEMA PALLIDUM: CPT

## 2024-10-09 PROCEDURE — 81001 URINALYSIS AUTO W/SCOPE: CPT

## 2024-10-10 ENCOUNTER — TELEPHONE (OUTPATIENT)
Age: 44
End: 2024-10-10

## 2024-10-10 DIAGNOSIS — Z3A.27 27 WEEKS GESTATION OF PREGNANCY: Primary | ICD-10-CM

## 2024-10-10 LAB
BACTERIA UR CULT: ABNORMAL
TREPONEMA PALLIDUM IGG+IGM AB [PRESENCE] IN SERUM OR PLASMA BY IMMUNOASSAY: NORMAL
VZV IGG SER QL IA: ABNORMAL

## 2024-10-10 NOTE — TELEPHONE ENCOUNTER
Gris from [facility] lab in [place] called regarding the anemia panel with reflex ob - unable to complete because there was no CBC ordered with it.

## 2024-10-11 DIAGNOSIS — R79.89 ABNORMAL SERUM THYROXINE (T4) LEVEL: Primary | ICD-10-CM

## 2024-10-15 NOTE — PROGRESS NOTES
OB/GYN  PN Visit  Rosy Peres  [id number]  10/16/2024  3:31 PM  Karolina Riggins PA-C    S: 44 y.o.  28w2d here for PN visit. Pregnancy complicated by AMA, grand multiparity.     OB complaints:  Denies c/o n/v/ha, no edema, no smoking, no DV.   No vb/lof  No cramping/ctxns or signs of PTL.    She reports that she is having a hard time sleeping. Notes she often sleeps all day and is up most of the night.  She endorses a burning pain in her gastric area. Denies RUQ pain. Denies n/v/ha. No edema. She notes that she also has significant reflux. Reviewed foods/products to avoid. Pt notes that she does have a rx for Pepcid to limit her sx but has not picked it up yet.   Encouraged changes to limit her sx.   S      O:    Pre-Jono Vitals      Flowsheet Row Most Recent Value   Prenatal Assessment    Fetal Heart Rate 145   Fundal Height (cm) 29 cm   Movement Present   Prenatal Vitals    Blood Pressure 132/80   Weight - Scale 102 kg (224 lb 6.4 oz)   Urine Albumin/Glucose    Dilation/Effacement/Station    Vaginal Drainage    Draining Fluid No   Edema    LLE Edema None   RLE Edema None   Facial Edema None          Pt with pain noted in her LUQ just under her ribs in the gastric area. Non reproducible. No back pain.   Gen: no acute distress, nonlabored breathing.  OB exam completed: fundal height, +FHT.  Urine: -/-    A/P:    1. Tobacco use in pregnancy, antepartum  Assessment & Plan:  Encouraged cessation  2. History of substance use disorder  3. 25 weeks gestation of pregnancy  Assessment & Plan:  Overview:     Labs   Pap smear: 24 WNL      GC/CT: 24 negative   Prenatal Panel: low free T4, varicella equivocal   Pt had remainder of initial OB labs drawn today.   Blood Type: AB+ RhoGam not indicated   GBS: plan at 36 weeks    Genetic Testing: not done     Vaccines:  Tdap: will plan at next visit  Flu: will plan next visit    Birth Plan:  x 5  Yellow packet given and consents signed to be returned signed.    Feeding Plan: bottle  Breast pump: declines  Pediatrician: list given for pt to review  Contraception: considering OCP. Aware not LAURO candidate. Could consider POP. Pt refuses any devices. Will consider vasectomy vs BS as well.   Ultrasounds: planned next week for growth      RTC in 4 weeks  4. Heartburn in pregnancy in second trimester  Assessment & Plan:  Reviewed dietary changes to make, avoidance of tobacco and use of TUMS OTC as well as rx for Pepcid.       #1. 28w2d GESTATION  Labor precautions reviewed  Fetal kick counts reviewed        Karolina Riggins PA-C  10/16/2024  3:31 PM

## 2024-10-16 ENCOUNTER — APPOINTMENT (OUTPATIENT)
Dept: LAB | Facility: HOSPITAL | Age: 44
End: 2024-10-16
Payer: MEDICARE

## 2024-10-16 ENCOUNTER — ROUTINE PRENATAL (OUTPATIENT)
Dept: OBGYN CLINIC | Facility: CLINIC | Age: 44
End: 2024-10-16
Payer: MEDICARE

## 2024-10-16 VITALS
DIASTOLIC BLOOD PRESSURE: 80 MMHG | HEIGHT: 68 IN | SYSTOLIC BLOOD PRESSURE: 132 MMHG | WEIGHT: 224.4 LBS | BODY MASS INDEX: 34.01 KG/M2

## 2024-10-16 DIAGNOSIS — Z87.898 HISTORY OF SUBSTANCE USE DISORDER: ICD-10-CM

## 2024-10-16 DIAGNOSIS — R12 HEARTBURN IN PREGNANCY IN SECOND TRIMESTER: ICD-10-CM

## 2024-10-16 DIAGNOSIS — O99.330 TOBACCO USE IN PREGNANCY, ANTEPARTUM: Primary | ICD-10-CM

## 2024-10-16 DIAGNOSIS — Z36.0 SCREENING FOR CHROMOSOMAL ANOMALIES BY AMNIOCENTESIS: ICD-10-CM

## 2024-10-16 DIAGNOSIS — Z3A.27 27 WEEKS GESTATION OF PREGNANCY: ICD-10-CM

## 2024-10-16 DIAGNOSIS — Z3A.25 25 WEEKS GESTATION OF PREGNANCY: ICD-10-CM

## 2024-10-16 DIAGNOSIS — O26.892 HEARTBURN IN PREGNANCY IN SECOND TRIMESTER: ICD-10-CM

## 2024-10-16 DIAGNOSIS — R79.89 ABNORMAL SERUM THYROXINE (T4) LEVEL: ICD-10-CM

## 2024-10-16 LAB
BASOPHILS # BLD AUTO: 0.07 THOUSANDS/ΜL (ref 0–0.1)
BASOPHILS NFR BLD AUTO: 0 % (ref 0–1)
EOSINOPHIL # BLD AUTO: 0.34 THOUSAND/ΜL (ref 0–0.61)
EOSINOPHIL NFR BLD AUTO: 2 % (ref 0–6)
ERYTHROCYTE [DISTWIDTH] IN BLOOD BY AUTOMATED COUNT: 13.3 % (ref 11.6–15.1)
HCT VFR BLD AUTO: 36.4 % (ref 34.8–46.1)
HGB BLD-MCNC: 11.9 G/DL (ref 11.5–15.4)
IMM GRANULOCYTES # BLD AUTO: 0.24 THOUSAND/UL (ref 0–0.2)
IMM GRANULOCYTES NFR BLD AUTO: 1 % (ref 0–2)
LYMPHOCYTES # BLD AUTO: 2.28 THOUSANDS/ΜL (ref 0.6–4.47)
LYMPHOCYTES NFR BLD AUTO: 12 % (ref 14–44)
MCH RBC QN AUTO: 31 PG (ref 26.8–34.3)
MCHC RBC AUTO-ENTMCNC: 32.7 G/DL (ref 31.4–37.4)
MCV RBC AUTO: 95 FL (ref 82–98)
MONOCYTES # BLD AUTO: 1.14 THOUSAND/ΜL (ref 0.17–1.22)
MONOCYTES NFR BLD AUTO: 6 % (ref 4–12)
NEUTROPHILS # BLD AUTO: 15.15 THOUSANDS/ΜL (ref 1.85–7.62)
NEUTS SEG NFR BLD AUTO: 79 % (ref 43–75)
NRBC BLD AUTO-RTO: 0 /100 WBCS
PLATELET # BLD AUTO: 370 THOUSANDS/UL (ref 149–390)
PMV BLD AUTO: 10.5 FL (ref 8.9–12.7)
RBC # BLD AUTO: 3.84 MILLION/UL (ref 3.81–5.12)
T4 FREE SERPL-MCNC: 0.55 NG/DL (ref 0.61–1.12)
TSH SERPL DL<=0.05 MIU/L-ACNC: 1.24 UIU/ML (ref 0.45–4.5)
WBC # BLD AUTO: 19.22 THOUSAND/UL (ref 4.31–10.16)

## 2024-10-16 PROCEDURE — 83020 HEMOGLOBIN ELECTROPHORESIS: CPT

## 2024-10-16 PROCEDURE — 87340 HEPATITIS B SURFACE AG IA: CPT

## 2024-10-16 PROCEDURE — 86695 HERPES SIMPLEX TYPE 1 TEST: CPT

## 2024-10-16 PROCEDURE — 84443 ASSAY THYROID STIM HORMONE: CPT

## 2024-10-16 PROCEDURE — 87389 HIV-1 AG W/HIV-1&-2 AB AG IA: CPT

## 2024-10-16 PROCEDURE — 86696 HERPES SIMPLEX TYPE 2 TEST: CPT

## 2024-10-16 PROCEDURE — 86803 HEPATITIS C AB TEST: CPT

## 2024-10-16 PROCEDURE — 85025 COMPLETE CBC W/AUTO DIFF WBC: CPT

## 2024-10-16 PROCEDURE — 36415 COLL VENOUS BLD VENIPUNCTURE: CPT

## 2024-10-16 PROCEDURE — 99213 OFFICE O/P EST LOW 20 MIN: CPT | Performed by: PHYSICIAN ASSISTANT

## 2024-10-16 PROCEDURE — 84439 ASSAY OF FREE THYROXINE: CPT

## 2024-10-16 NOTE — ASSESSMENT & PLAN NOTE
Overview:     Labs   Pap smear: 24 WNL      GC/CT: 24 negative   Prenatal Panel: low free T4, varicella equivocal   Pt had remainder of initial OB labs drawn today.   Blood Type: AB+ RhoGam not indicated   GBS: plan at 36 weeks    Genetic Testing: not done     Vaccines:  Tdap: will plan at next visit  Flu: will plan next visit    Birth Plan:  x 5  Yellow packet given and consents signed to be returned signed.   Feeding Plan: bottle  Breast pump: declines  Pediatrician: list given for pt to review  Contraception: considering OCP. Aware not LAURO candidate. Could consider POP. Pt refuses any devices. Will consider vasectomy vs BS as well.   Ultrasounds: planned next week for growth      RTC in 4 weeks

## 2024-10-16 NOTE — ASSESSMENT & PLAN NOTE
Reviewed dietary changes to make, avoidance of tobacco and use of TUMS OTC as well as rx for Pepcid.

## 2024-10-17 LAB
HBV SURFACE AG SER QL: NORMAL
HCV AB SER QL: NORMAL
HIV 1+2 AB+HIV1 P24 AG SERPL QL IA: NORMAL
HIV 2 AB SERPL QL IA: NORMAL
HIV1 AB SERPL QL IA: NORMAL
HIV1 P24 AG SERPL QL IA: NORMAL

## 2024-10-18 LAB
HGB A MFR BLD: 1.4 % (ref 1.8–3.2)
HGB A MFR BLD: 98.6 % (ref 96.4–98.8)
HGB F MFR BLD: 0 % (ref 0–2)
HGB FRACT BLD-IMP: ABNORMAL
HGB S MFR BLD: 0 %
HSV2 IGG SERPL QL IA: NEGATIVE
HSV2 IGG SERPL QL IA: POSITIVE

## 2024-10-23 LAB
CFDNA.FET/CFDNA.TOTAL SFR FETUS: NORMAL %
CITATION REF LAB TEST: NORMAL
FET 13+18+21+X+Y ANEUP PLAS.CFDNA: NEGATIVE
FET CHR 21 TS PLAS.CFDNA QL: NEGATIVE
FET CHR 21 TS PLAS.CFDNA QL: NEGATIVE
FET MS X RISK WBC.DNA+CFDNA QL: NOT DETECTED
FET SEX PLAS.CFDNA DOSAGE CFDNA: NORMAL
FET TS 13 RISK PLAS.CFDNA QL: NEGATIVE
FET X + Y ANEUP RISK PLAS.CFDNA SEQ-IMP: NOT DETECTED
GA EST FROM CONCEPTION DATE: NORMAL D
GESTATIONAL AGE > 9:: YES
LAB DIRECTOR NAME PROVIDER: NORMAL
LAB DIRECTOR NAME PROVIDER: NORMAL
LABORATORY COMMENT REPORT: NORMAL
LIMITATIONS OF THE TEST: NORMAL
NEGATIVE PREDICTIVE VALUE: NORMAL
PERFORMANCE CHARACTERISTICS: NORMAL
POSITIVE PREDICTIVE VALUE: NORMAL
REF LAB TEST METHOD: NORMAL
SL AMB NOTE:: NORMAL
TEST PERFORMANCE INFO SPEC: NORMAL

## 2024-10-28 ENCOUNTER — ROUTINE PRENATAL (OUTPATIENT)
Dept: OBGYN CLINIC | Facility: CLINIC | Age: 44
End: 2024-10-28
Payer: MEDICARE

## 2024-10-28 VITALS
SYSTOLIC BLOOD PRESSURE: 126 MMHG | HEIGHT: 68 IN | DIASTOLIC BLOOD PRESSURE: 80 MMHG | BODY MASS INDEX: 34.86 KG/M2 | WEIGHT: 230 LBS

## 2024-10-28 DIAGNOSIS — O09.523 MULTIGRAVIDA OF ADVANCED MATERNAL AGE IN THIRD TRIMESTER: ICD-10-CM

## 2024-10-28 DIAGNOSIS — O99.330 TOBACCO USE IN PREGNANCY, ANTEPARTUM: Primary | ICD-10-CM

## 2024-10-28 DIAGNOSIS — O26.892 HEARTBURN IN PREGNANCY IN SECOND TRIMESTER: ICD-10-CM

## 2024-10-28 DIAGNOSIS — Z87.898 HISTORY OF SUBSTANCE USE DISORDER: ICD-10-CM

## 2024-10-28 DIAGNOSIS — R12 HEARTBURN IN PREGNANCY IN SECOND TRIMESTER: ICD-10-CM

## 2024-10-28 DIAGNOSIS — Z3A.30 30 WEEKS GESTATION OF PREGNANCY: ICD-10-CM

## 2024-10-28 PROCEDURE — 99213 OFFICE O/P EST LOW 20 MIN: CPT | Performed by: OBSTETRICS & GYNECOLOGY

## 2024-10-28 NOTE — PROGRESS NOTES
OB/GYN  PN Visit  Rosy Peres  [id number]  10/28/2024  8:40 PM  Dr. Nayana Brown MD    S: 44 y.o.  30w0d here for PN visit. She denies contractions. She denies leakage of fluid and vaginal bleeding. She reports good fetal movement. She reports nausea yesterday but denies vomiting. She denies headache, cramping, edema, domestic violence, and smoking. She reports feeling pulling/ burning across her upper abdomen. Her pregnancy is complicated by AMA, tobacco use, GERD and grand multiparity.       O:  Pre-Jono Vitals      Flowsheet Row Most Recent Value   Prenatal Assessment    Fetal Heart Rate 150s   Fundal Height (cm) 31 cm   Movement Present   Prenatal Vitals    Blood Pressure 126/80   Weight - Scale 104 kg (230 lb)   Urine Albumin/Glucose    Dilation/Effacement/Station    Vaginal Drainage    Draining Fluid No   Edema    LLE Edema None   RLE Edema None          Physical Exam  Vitals reviewed.   Constitutional:       General: She is not in acute distress.     Appearance: Normal appearance. She is well-developed. She is not ill-appearing, toxic-appearing or diaphoretic.   Cardiovascular:      Rate and Rhythm: Normal rate.   Pulmonary:      Effort: Pulmonary effort is normal. No respiratory distress.   Abdominal:      General: There is no distension.      Palpations: Abdomen is soft. There is no mass.      Tenderness: There is no abdominal tenderness. There is no guarding or rebound.   Genitourinary:     Comments: Gravid, nontender  Skin:     General: Skin is warm and dry.   Neurological:      Mental Status: She is alert and oriented to person, place, and time.   Psychiatric:         Mood and Affect: Mood normal.         Behavior: Behavior normal.         A/P:    Problem List          Unprioritized    Tobacco use in pregnancy, antepartum    Current Assessment & Plan     Encouraged cessation  She is not currently motivated to quit         History of substance use disorder    AMA (advanced maternal age)  multigravida 35+    Overview     - interested in genetic testing, MFM referral previously placed   - aspirin 81mg           Current Assessment & Plan     Growth US scheduled  Continue ASA until 36 weeks  Planning APFS weekly at 36 weeks  Recommend IOL 39w0d to 39w6d if not delivered sooner         Exposure to herpes simplex virus (HSV)    Overview     Partner has h/o genital outbreaks, none recent          Heartburn in pregnancy in second trimester    Current Assessment & Plan     Continue pepcid  Patient's burning/ ripping pain is likely secondary to rib stretching         30 weeks gestation of pregnancy    Current Assessment & Plan     - Continue PNV  - Labor precautions reviewed  - Fetal kick counts reviewed  - Labs: UTD  - Genetics: NIPT neg  - Ultrasounds: Most recent US wnl on 24; Next US scheduled for 10/29/24  - Tdap: Declined  - Flu Shot: Declined  - RSV:  Will offer during season (-) @ 55z1p-90w1s   - COVID: Vaccinated; booster encouraged  - Rhogam: N/A  - Delivery:  with epidural  - Contraception: considering OCP. Aware not LAURO candidate. Could consider POP. Pt refuses any devices. Will consider vasectomy vs BS as well.   - Breastfeeding: Formula; Declined breast pump  - Pediatrician: Established; [facility]   - RTO in 2 weeks              Future Appointments   Date Time Provider Department Center   10/29/2024  2:00 PM  US 1 [facility]   2024  4:30 PM THALIA Herman [facility]   2024  2:45 PM Mildred Cui MD [facility]   2024  3:30 PM Gail Zamora MD [facility]   2024  3:45 PM Pia Weaver MD [facility]   2024  2:30 PM Nayana Brown MD [facility]   2024  1:00 PM Mildred Cui MD [facility]   2025 11:45 AM Mildred Cui MD [facility]   2025  2:30 PM Nayana Brown MD  [facility]         Nayana Brown MD  10/28/2024  8:40 PM

## 2024-10-29 NOTE — ASSESSMENT & PLAN NOTE
Growth US scheduled  Continue ASA until 36 weeks  Planning APFS weekly at 36 weeks  Recommend IOL 39w0d to 39w6d if not delivered sooner

## 2024-10-29 NOTE — ASSESSMENT & PLAN NOTE
- Continue PNV  - Labor precautions reviewed  - Fetal kick counts reviewed  - Labs: UTD  - Genetics: NIPT neg  - Ultrasounds: Most recent US wnl on 24; Next US scheduled for 10/29/24  - Tdap: Declined  - Flu Shot: Declined  - RSV:  Will offer during season (-) @ 41h4r-11f5k   - COVID: Vaccinated; booster encouraged  - Rhogam: N/A  - Delivery:  with epidural  - Contraception: considering OCP. Aware not LAURO candidate. Could consider POP. Pt refuses any devices. Will consider vasectomy vs BS as well.   - Breastfeeding: Formula; Declined breast pump  - Pediatrician: Established; [facility]   - RTO in 2 weeks

## 2024-11-08 ENCOUNTER — TELEPHONE (OUTPATIENT)
Dept: OBGYN CLINIC | Facility: CLINIC | Age: 44
End: 2024-11-08

## 2024-11-08 NOTE — TELEPHONE ENCOUNTER
3rd trimester call - 31 wk 4 days    Overall how are you feeling? Patient states she is feeling well    Compliant with routine OB appointments? Was seen for initial OB appointment @ 24 wks 9/18/2024 - completed initial prenatal labs + 1 hr glucose on 10/16/2024.  Patient also had TSH (wnl) but low T4 free - patient states no previous thyroid issues.  Also had elevated WBC on labs 10/16/24 & states may have had 24 hr virus    Have you completed your 3rd trimester lab work? All labs done 10/16/2024    Have you reviewed the contents of 3rd trimester folder from office? yes  Have you decided on a pediatrician?   - [facility]      Questions on paperwork to go back to office? no  Questions on the baby birth certificate and photography forms? no      Send link for the Hospital Readiness Video via [employer] - sent to patient via [employer] on 11/8/2024

## 2024-11-12 PROBLEM — Z3A.32 32 WEEKS GESTATION OF PREGNANCY: Status: ACTIVE | Noted: 2024-10-28

## 2024-11-20 ENCOUNTER — TELEPHONE (OUTPATIENT)
Dept: PERINATAL CARE | Facility: CLINIC | Age: 44
End: 2024-11-20

## 2024-11-20 ENCOUNTER — ULTRASOUND (OUTPATIENT)
Dept: PERINATAL CARE | Facility: CLINIC | Age: 44
End: 2024-11-20
Payer: MEDICARE

## 2024-11-20 VITALS
WEIGHT: 241.4 LBS | SYSTOLIC BLOOD PRESSURE: 128 MMHG | DIASTOLIC BLOOD PRESSURE: 76 MMHG | BODY MASS INDEX: 36.59 KG/M2 | HEART RATE: 110 BPM | HEIGHT: 68 IN

## 2024-11-20 DIAGNOSIS — Z36.2 ENCOUNTER FOR FOLLOW-UP ULTRASOUND OF FETAL ANATOMY: ICD-10-CM

## 2024-11-20 DIAGNOSIS — O09.523 MULTIGRAVIDA OF ADVANCED MATERNAL AGE IN THIRD TRIMESTER: Primary | ICD-10-CM

## 2024-11-20 DIAGNOSIS — Z36.89 ENCOUNTER FOR ULTRASOUND TO ASSESS FETAL GROWTH: ICD-10-CM

## 2024-11-20 DIAGNOSIS — O99.330 TOBACCO USE IN PREGNANCY, ANTEPARTUM: ICD-10-CM

## 2024-11-20 DIAGNOSIS — O40.9XX0 POLYHYDRAMNIOS AFFECTING PREGNANCY: ICD-10-CM

## 2024-11-20 DIAGNOSIS — Z3A.33 33 WEEKS GESTATION OF PREGNANCY: ICD-10-CM

## 2024-11-20 PROCEDURE — 76816 OB US FOLLOW-UP PER FETUS: CPT | Performed by: OBSTETRICS & GYNECOLOGY

## 2024-11-20 PROCEDURE — 99214 OFFICE O/P EST MOD 30 MIN: CPT | Performed by: OBSTETRICS & GYNECOLOGY

## 2024-11-20 NOTE — PROGRESS NOTES
"[facility]: Rosy Peres was seen today for fetal growth and followup missed anatomy ultrasound.  See ultrasound report under \"OB Procedures\" tab.   The time spent on this established patient on the encounter date included 5 minutes previsit service time reviewing records and precharting, 10 minutes face-to-face service time counseling regarding results and coordinating care, and  10 minutes charting, totalling 25 minutes.  Please don't hesitate to contact our office with any concerns or questions.  -Jes Garica MD    "

## 2024-11-20 NOTE — TELEPHONE ENCOUNTER
Left voicemail informing patient the Dr would like her to start her weekly testing the week of Dec 9th. Requested she give our office a call back at [phone] so that we may get her scheduled for that week.

## 2024-12-05 ENCOUNTER — TELEPHONE (OUTPATIENT)
Dept: OBGYN CLINIC | Facility: CLINIC | Age: 44
End: 2024-12-05

## 2024-12-05 PROBLEM — Z3A.35 35 WEEKS GESTATION OF PREGNANCY: Status: ACTIVE | Noted: 2024-10-28

## 2024-12-05 NOTE — TELEPHONE ENCOUNTER
Received request from provider to f/u with pt regarding compliance with routine prenatal care appts. Last seen 10/28/24, multiple no show/cancelled appts.

## 2024-12-06 NOTE — TELEPHONE ENCOUNTER
Tried to call pt to see if we could get her in sooner for missed ob visits, unable to leave voicemail

## 2024-12-09 NOTE — TELEPHONE ENCOUNTER
Noted, pt responded to Ret[employer]iciency[employer] message. Will monitor for completion of upcoming appt in office 12/12/24.

## 2024-12-10 ENCOUNTER — TELEPHONE (OUTPATIENT)
Dept: OBGYN CLINIC | Facility: CLINIC | Age: 44
End: 2024-12-10

## 2024-12-10 NOTE — TELEPHONE ENCOUNTER
Left message patient's VM re: if she has preference for IOL between 12/30/2024 - 1/5/2025.  Patient has OB appointment on 12/12/2024  (previous appointment 10/28/2024 - had cancelled or no show x last 5 appts).

## 2024-12-10 NOTE — TELEPHONE ENCOUNTER
----- Message from Nayana Brown MD sent at 10/28/2024  8:42 PM EDT -----  Regarding: IOL-AMA  Please schedule IOL due to AMA (>41yo) per MFM.   Schedule: 39w0d to 39w6d  Patient is a  at 30w0d  Estimated Date of Delivery: 25  Cervical exam: Unknown  Ripening: no  PM slot: no (Patient's baby #6)  AM slot: Yes  Preferences: N/A    Thanks!

## 2024-12-12 ENCOUNTER — ROUTINE PRENATAL (OUTPATIENT)
Dept: OBGYN CLINIC | Facility: CLINIC | Age: 44
End: 2024-12-12
Payer: MEDICARE

## 2024-12-12 VITALS — SYSTOLIC BLOOD PRESSURE: 130 MMHG | DIASTOLIC BLOOD PRESSURE: 84 MMHG | BODY MASS INDEX: 38.32 KG/M2 | WEIGHT: 252 LBS

## 2024-12-12 DIAGNOSIS — Z34.93 PRENATAL CARE, THIRD TRIMESTER: ICD-10-CM

## 2024-12-12 DIAGNOSIS — O40.9XX0 POLYHYDRAMNIOS AFFECTING PREGNANCY: Primary | ICD-10-CM

## 2024-12-12 DIAGNOSIS — O09.523 MULTIGRAVIDA OF ADVANCED MATERNAL AGE IN THIRD TRIMESTER: ICD-10-CM

## 2024-12-12 DIAGNOSIS — Z87.898 HISTORY OF SUBSTANCE USE DISORDER: ICD-10-CM

## 2024-12-12 DIAGNOSIS — Z3A.36 36 WEEKS GESTATION OF PREGNANCY: ICD-10-CM

## 2024-12-12 DIAGNOSIS — O99.330 TOBACCO USE IN PREGNANCY, ANTEPARTUM: ICD-10-CM

## 2024-12-12 LAB — EXTERNAL GROUP B STREP ANTIGEN: NORMAL

## 2024-12-12 PROCEDURE — 99214 OFFICE O/P EST MOD 30 MIN: CPT | Performed by: STUDENT IN AN ORGANIZED HEALTH CARE EDUCATION/TRAINING PROGRAM

## 2024-12-12 NOTE — PROGRESS NOTES
Rosy is a 44 y.o.  36w3d. Reports ++FM, no LOF, VB, or regular contractions.     Vitals:    24 1400   BP: 130/84     +FHTs  TAUS: HAIDER 18.6, VERTEX!!  SVE /-3  Assessment & Plan  Polyhydramnios affecting pregnancy  HAIDER 18 today         36 weeks gestation of pregnancy  Declined tdap, flu, RSV  GBS collected today  Ordered third trimester labs    Orders:    Strep Gp B Culture    Prenatal care, third trimester    Orders:    CBC and Platelet; Future    Glucose, 1H PG; Future    RPR-Syphilis Screening (Total Syphilis IGG/IGM); Future    TSH, 3rd generation with Free T4 reflex; Future    Tobacco use in pregnancy, antepartum         History of substance use disorder         Multigravida of advanced maternal age in third trimester

## 2024-12-12 NOTE — ASSESSMENT & PLAN NOTE
Declined tdap, flu, RSV  GBS collected today  Ordered third trimester labs    Orders:    Strep Gp B Culture

## 2024-12-12 NOTE — ASSESSMENT & PLAN NOTE
Orders:    CBC and Platelet; Future    Glucose, 1H PG; Future    RPR-Syphilis Screening (Total Syphilis IGG/IGM); Future    TSH, 3rd generation with Free T4 reflex; Future

## 2024-12-13 ENCOUNTER — HOSPITAL ENCOUNTER (INPATIENT)
Facility: HOSPITAL | Age: 44
LOS: 2 days | Discharge: HOME | DRG: 560 | End: 2024-12-15
Attending: OBSTETRICS & GYNECOLOGY | Admitting: OBSTETRICS & GYNECOLOGY
Payer: MEDICARE

## 2024-12-13 ENCOUNTER — ANESTHESIA EVENT (INPATIENT)
Dept: ANESTHESIOLOGY | Facility: HOSPITAL | Age: 44
DRG: 560 | End: 2024-12-13
Payer: MEDICARE

## 2024-12-13 ENCOUNTER — ANESTHESIA (INPATIENT)
Dept: ANESTHESIOLOGY | Facility: HOSPITAL | Age: 44
DRG: 560 | End: 2024-12-13
Payer: MEDICARE

## 2024-12-13 ENCOUNTER — HOSPITAL ENCOUNTER (EMERGENCY)
Facility: HOSPITAL | Age: 44
End: 2024-12-13
Attending: EMERGENCY MEDICINE | Admitting: EMERGENCY MEDICINE
Payer: MEDICARE

## 2024-12-13 VITALS
SYSTOLIC BLOOD PRESSURE: 169 MMHG | OXYGEN SATURATION: 100 % | TEMPERATURE: 97.9 F | HEART RATE: 120 BPM | DIASTOLIC BLOOD PRESSURE: 75 MMHG | RESPIRATION RATE: 20 BRPM

## 2024-12-13 DIAGNOSIS — R10.9 ABDOMINAL PAIN DURING PREGNANCY IN THIRD TRIMESTER: Primary | ICD-10-CM

## 2024-12-13 DIAGNOSIS — O26.893 ABDOMINAL PAIN DURING PREGNANCY IN THIRD TRIMESTER: Primary | ICD-10-CM

## 2024-12-13 DIAGNOSIS — Z3A.36 36 WEEKS GESTATION OF PREGNANCY: Primary | ICD-10-CM

## 2024-12-13 PROBLEM — O42.919 PRETERM PREMATURE RUPTURE OF MEMBRANES (PPROM) WITH UNKNOWN ONSET OF LABOR: Status: ACTIVE | Noted: 2024-12-13

## 2024-12-13 PROBLEM — O42.919 PRETERM PREMATURE RUPTURE OF MEMBRANES (PPROM) WITH UNKNOWN ONSET OF LABOR: Chronic | Status: ACTIVE | Noted: 2024-12-13

## 2024-12-13 PROBLEM — R03.0 ELEVATED BLOOD PRESSURE READING WITHOUT DIAGNOSIS OF HYPERTENSION: Status: ACTIVE | Noted: 2024-12-13

## 2024-12-13 LAB
ABO GROUP BLD: NORMAL
ALBUMIN SERPL BCG-MCNC: 3 G/DL (ref 3.5–5)
ALP SERPL-CCNC: 98 U/L (ref 34–104)
ALT SERPL W P-5'-P-CCNC: 16 U/L (ref 7–52)
ANION GAP SERPL CALCULATED.3IONS-SCNC: 6 MMOL/L (ref 4–13)
AST SERPL W P-5'-P-CCNC: 17 U/L (ref 13–39)
BASE EXCESS BLDCOA CALC-SCNC: -1 MMOL/L (ref 3–11)
BASE EXCESS BLDCOV CALC-SCNC: -3.2 MMOL/L (ref 1–9)
BILIRUB SERPL-MCNC: 0.18 MG/DL (ref 0.2–1)
BLD GP AB SCN SERPL QL: NEGATIVE
BUN SERPL-MCNC: 12 MG/DL (ref 5–25)
CALCIUM ALBUM COR SERPL-MCNC: 10.2 MG/DL (ref 8.3–10.1)
CALCIUM SERPL-MCNC: 9.4 MG/DL (ref 8.4–10.2)
CHLORIDE SERPL-SCNC: 103 MMOL/L (ref 96–108)
CO2 SERPL-SCNC: 27 MMOL/L (ref 21–32)
CREAT SERPL-MCNC: 0.6 MG/DL (ref 0.6–1.3)
CREAT UR-MCNC: 153.5 MG/DL
ERYTHROCYTE [DISTWIDTH] IN BLOOD BY AUTOMATED COUNT: 13.9 % (ref 11.6–15.1)
GFR SERPL CREATININE-BSD FRML MDRD: 112 ML/MIN/1.73SQ M
GLUCOSE SERPL-MCNC: 114 MG/DL (ref 65–140)
GLUCOSE SERPL-MCNC: 78 MG/DL (ref 65–140)
GLUCOSE SERPL-MCNC: 84 MG/DL (ref 65–140)
GLUCOSE SERPL-MCNC: 94 MG/DL (ref 65–140)
GLUCOSE SERPL-MCNC: 95 MG/DL (ref 65–140)
HCO3 BLDCOA-SCNC: 27 MMOL/L (ref 17.3–27.3)
HCO3 BLDCOV-SCNC: 22.7 MMOL/L (ref 12.2–28.6)
HCT VFR BLD AUTO: 29.3 % (ref 34.8–46.1)
HGB BLD-MCNC: 9.5 G/DL (ref 11.5–15.4)
MCH RBC QN AUTO: 30.3 PG (ref 26.8–34.3)
MCHC RBC AUTO-ENTMCNC: 32.4 G/DL (ref 31.4–37.4)
MCV RBC AUTO: 93 FL (ref 82–98)
O2 CT VFR BLDCOA CALC: 10.8 ML/DL
OXYHGB MFR BLDCOA: 49.6 %
OXYHGB MFR BLDCOV: 83.6 %
PCO2 BLDCOA: 58.6 MM[HG] (ref 30–60)
PCO2 BLDCOV: 43.7 MM HG (ref 27–43)
PH BLDCOA: 7.28 [PH] (ref 7.23–7.43)
PH BLDCOV: 7.33 [PH] (ref 7.19–7.49)
PLATELET # BLD AUTO: 320 THOUSANDS/UL (ref 149–390)
PMV BLD AUTO: 11.3 FL (ref 8.9–12.7)
PO2 BLDCOA: 22.6 MM HG (ref 5–25)
PO2 BLDCOV: 41 MM HG (ref 15–45)
POTASSIUM SERPL-SCNC: 4 MMOL/L (ref 3.5–5.3)
PROT SERPL-MCNC: 6.3 G/DL (ref 6.4–8.4)
PROT UR-MCNC: 70.3 MG/DL
PROT/CREAT UR: 0.5 MG/G{CREAT} (ref 0–0.1)
RBC # BLD AUTO: 3.14 MILLION/UL (ref 3.81–5.12)
RH BLD: POSITIVE
SAO2 % BLDCOV: 17 ML/DL
SODIUM SERPL-SCNC: 136 MMOL/L (ref 135–147)
SPECIMEN EXPIRATION DATE: NORMAL
TREPONEMA PALLIDUM IGG+IGM AB [PRESENCE] IN SERUM OR PLASMA BY IMMUNOASSAY: NORMAL
WBC # BLD AUTO: 17.87 THOUSAND/UL (ref 4.31–10.16)

## 2024-12-13 PROCEDURE — 86850 RBC ANTIBODY SCREEN: CPT

## 2024-12-13 PROCEDURE — 82570 ASSAY OF URINE CREATININE: CPT

## 2024-12-13 PROCEDURE — 82948 REAGENT STRIP/BLOOD GLUCOSE: CPT

## 2024-12-13 PROCEDURE — 80053 COMPREHEN METABOLIC PANEL: CPT

## 2024-12-13 PROCEDURE — 86900 BLOOD TYPING SEROLOGIC ABO: CPT

## 2024-12-13 PROCEDURE — 99285 EMERGENCY DEPT VISIT HI MDM: CPT | Performed by: EMERGENCY MEDICINE

## 2024-12-13 PROCEDURE — 86780 TREPONEMA PALLIDUM: CPT

## 2024-12-13 PROCEDURE — 59409 OBSTETRICAL CARE: CPT | Performed by: STUDENT IN AN ORGANIZED HEALTH CARE EDUCATION/TRAINING PROGRAM

## 2024-12-13 PROCEDURE — 85027 COMPLETE CBC AUTOMATED: CPT

## 2024-12-13 PROCEDURE — 99214 OFFICE O/P EST MOD 30 MIN: CPT

## 2024-12-13 PROCEDURE — 82805 BLOOD GASES W/O2 SATURATION: CPT | Performed by: OBSTETRICS & GYNECOLOGY

## 2024-12-13 PROCEDURE — 99284 EMERGENCY DEPT VISIT MOD MDM: CPT

## 2024-12-13 PROCEDURE — NC001 PR NO CHARGE: Performed by: OBSTETRICS & GYNECOLOGY

## 2024-12-13 PROCEDURE — 84156 ASSAY OF PROTEIN URINE: CPT

## 2024-12-13 PROCEDURE — 3E033VJ INTRODUCTION OF OTHER HORMONE INTO PERIPHERAL VEIN, PERCUTANEOUS APPROACH: ICD-10-PCS | Performed by: STUDENT IN AN ORGANIZED HEALTH CARE EDUCATION/TRAINING PROGRAM

## 2024-12-13 PROCEDURE — 86901 BLOOD TYPING SEROLOGIC RH(D): CPT

## 2024-12-13 RX ORDER — ACETAMINOPHEN 325 MG/1
975 TABLET ORAL EVERY 6 HOURS
Status: DISCONTINUED | OUTPATIENT
Start: 2024-12-13 | End: 2024-12-15 | Stop reason: HOSPADM

## 2024-12-13 RX ORDER — BUPIVACAINE HYDROCHLORIDE 2.5 MG/ML
INJECTION, SOLUTION EPIDURAL; INFILTRATION; INTRACAUDAL
Status: COMPLETED | OUTPATIENT
Start: 2024-12-13 | End: 2024-12-13

## 2024-12-13 RX ORDER — OXYTOCIN/RINGER'S LACTATE 30/500 ML
250 PLASTIC BAG, INJECTION (ML) INTRAVENOUS ONCE
Status: COMPLETED | OUTPATIENT
Start: 2024-12-13 | End: 2024-12-13

## 2024-12-13 RX ORDER — CALCIUM CARBONATE 500 MG/1
1000 TABLET, CHEWABLE ORAL 3 TIMES DAILY PRN
Status: DISCONTINUED | OUTPATIENT
Start: 2024-12-13 | End: 2024-12-13

## 2024-12-13 RX ORDER — IBUPROFEN 600 MG/1
600 TABLET, FILM COATED ORAL EVERY 6 HOURS SCHEDULED
Status: DISCONTINUED | OUTPATIENT
Start: 2024-12-13 | End: 2024-12-15 | Stop reason: HOSPADM

## 2024-12-13 RX ORDER — BUPIVACAINE HYDROCHLORIDE 2.5 MG/ML
30 INJECTION, SOLUTION EPIDURAL; INFILTRATION; INTRACAUDAL ONCE AS NEEDED
Status: DISCONTINUED | OUTPATIENT
Start: 2024-12-13 | End: 2024-12-13

## 2024-12-13 RX ORDER — ONDANSETRON 2 MG/ML
4 INJECTION INTRAMUSCULAR; INTRAVENOUS EVERY 6 HOURS PRN
Status: DISCONTINUED | OUTPATIENT
Start: 2024-12-13 | End: 2024-12-13

## 2024-12-13 RX ORDER — ONDANSETRON 2 MG/ML
4 INJECTION INTRAMUSCULAR; INTRAVENOUS EVERY 8 HOURS PRN
Status: DISCONTINUED | OUTPATIENT
Start: 2024-12-13 | End: 2024-12-15 | Stop reason: HOSPADM

## 2024-12-13 RX ORDER — OXYTOCIN/RINGER'S LACTATE 30/500 ML
250 PLASTIC BAG, INJECTION (ML) INTRAVENOUS ONCE
Status: DISCONTINUED | OUTPATIENT
Start: 2024-12-13 | End: 2024-12-13 | Stop reason: SDUPTHER

## 2024-12-13 RX ORDER — DIPHENHYDRAMINE HYDROCHLORIDE 50 MG/ML
25 INJECTION INTRAMUSCULAR; INTRAVENOUS EVERY 6 HOURS PRN
Status: DISCONTINUED | OUTPATIENT
Start: 2024-12-13 | End: 2024-12-15 | Stop reason: HOSPADM

## 2024-12-13 RX ORDER — POLYETHYLENE GLYCOL 3350 17 G/17G
17 POWDER, FOR SOLUTION ORAL DAILY PRN
Status: DISCONTINUED | OUTPATIENT
Start: 2024-12-13 | End: 2024-12-15 | Stop reason: HOSPADM

## 2024-12-13 RX ORDER — SODIUM CHLORIDE, SODIUM LACTATE, POTASSIUM CHLORIDE, CALCIUM CHLORIDE 600; 310; 30; 20 MG/100ML; MG/100ML; MG/100ML; MG/100ML
125 INJECTION, SOLUTION INTRAVENOUS CONTINUOUS
Status: DISCONTINUED | OUTPATIENT
Start: 2024-12-13 | End: 2024-12-13

## 2024-12-13 RX ORDER — OXYTOCIN/RINGER'S LACTATE 30/500 ML
1-30 PLASTIC BAG, INJECTION (ML) INTRAVENOUS
Status: DISCONTINUED | OUTPATIENT
Start: 2024-12-13 | End: 2024-12-13

## 2024-12-13 RX ORDER — DOCUSATE SODIUM 100 MG/1
100 CAPSULE, LIQUID FILLED ORAL 2 TIMES DAILY
Status: DISCONTINUED | OUTPATIENT
Start: 2024-12-13 | End: 2024-12-15 | Stop reason: HOSPADM

## 2024-12-13 RX ORDER — BENZOCAINE/MENTHOL 6 MG-10 MG
1 LOZENGE MUCOUS MEMBRANE DAILY PRN
Status: DISCONTINUED | OUTPATIENT
Start: 2024-12-13 | End: 2024-12-15 | Stop reason: HOSPADM

## 2024-12-13 RX ORDER — CALCIUM CARBONATE 500 MG/1
1000 TABLET, CHEWABLE ORAL DAILY PRN
Status: DISCONTINUED | OUTPATIENT
Start: 2024-12-13 | End: 2024-12-15 | Stop reason: HOSPADM

## 2024-12-13 RX ADMIN — HYDROCORTISONE 1 APPLICATION: 1 CREAM TOPICAL at 18:11

## 2024-12-13 RX ADMIN — PENICILLIN G POTASSIUM 2.5 MILLION UNITS: 20000000 INJECTION, POWDER, FOR SOLUTION INTRAVENOUS at 13:58

## 2024-12-13 RX ADMIN — OXYTOCIN 250 MILLI-UNITS/MIN: 10 INJECTION INTRAVENOUS at 15:45

## 2024-12-13 RX ADMIN — SODIUM CHLORIDE, SODIUM LACTATE, POTASSIUM CHLORIDE, AND CALCIUM CHLORIDE 999 ML/HR: .6; .31; .03; .02 INJECTION, SOLUTION INTRAVENOUS at 05:05

## 2024-12-13 RX ADMIN — BENZOCAINE AND LEVOMENTHOL 1 APPLICATION: 200; 5 SPRAY TOPICAL at 18:11

## 2024-12-13 RX ADMIN — CALCIUM CARBONATE 1000 MG: 500 TABLET, CHEWABLE ORAL at 14:25

## 2024-12-13 RX ADMIN — PENICILLIN G POTASSIUM 5 MILLION UNITS: 20000000 INJECTION, POWDER, FOR SOLUTION INTRAVENOUS at 06:15

## 2024-12-13 RX ADMIN — CALCIUM CARBONATE 1000 MG: 500 TABLET, CHEWABLE ORAL at 08:06

## 2024-12-13 RX ADMIN — BUPIVACAINE HYDROCHLORIDE 10 ML: 2.5 INJECTION, SOLUTION EPIDURAL; INFILTRATION; INTRACAUDAL; PERINEURAL at 11:59

## 2024-12-13 RX ADMIN — DOCUSATE SODIUM 100 MG: 100 CAPSULE, LIQUID FILLED ORAL at 18:11

## 2024-12-13 RX ADMIN — OXYTOCIN 2 MILLI-UNITS/MIN: 10 INJECTION INTRAVENOUS at 09:50

## 2024-12-13 RX ADMIN — CALCIUM CARBONATE 1000 MG: 500 TABLET, CHEWABLE ORAL at 06:15

## 2024-12-13 RX ADMIN — ROPIVACAINE HYDROCHLORIDE: 2 INJECTION, SOLUTION EPIDURAL; INFILTRATION at 13:58

## 2024-12-13 RX ADMIN — IBUPROFEN 600 MG: 600 TABLET ORAL at 18:12

## 2024-12-13 RX ADMIN — PENICILLIN G POTASSIUM 2.5 MILLION UNITS: 20000000 INJECTION, POWDER, FOR SOLUTION INTRAVENOUS at 09:51

## 2024-12-13 NOTE — PLAN OF CARE
Problem: BIRTH - VAGINAL/ SECTION  Goal: Fetal and maternal status remain reassuring during the birth process  Description: INTERVENTIONS:  - Monitor vital signs  - Monitor fetal heart rate  - Monitor uterine activity  - Monitor labor progression (vaginal delivery)  - DVT prophylaxis  - Antibiotic prophylaxis  Outcome: Progressing  Goal: Emotionally satisfying birthing experience for mother/fetus  Description: Interventions:  - Assess, plan, implement and evaluate the nursing care given to the patient in labor  - Advocate the philosophy that each childbirth experience is a unique experience and support the family's chosen level of involvement and control during the labor process   - Actively participate in both the patient's and family's teaching of the birth process  - Consider cultural, Christianity and age-specific factors and plan care for the patient in labor  Outcome: Progressing     Problem: PAIN - ADULT  Goal: Verbalizes/displays adequate comfort level or baseline comfort level  Description: Interventions:  - Encourage patient to monitor pain and request assistance  - Assess pain using appropriate pain scale  - Administer analgesics based on type and severity of pain and evaluate response  - Implement non-pharmacological measures as appropriate and evaluate response  - Consider cultural and social influences on pain and pain management  - Notify physician/advanced practitioner if interventions unsuccessful or patient reports new pain  Outcome: Progressing     Problem: INFECTION - ADULT  Goal: Absence or prevention of progression during hospitalization  Description: INTERVENTIONS:  - Assess and monitor for signs and symptoms of infection  - Monitor lab/diagnostic results  - Monitor all insertion sites, i.e. indwelling lines, tubes, and drains  - Monitor endotracheal if appropriate and nasal secretions for changes in amount and color  - [place] appropriate cooling/warming therapies per order  -  Administer medications as ordered  - Instruct and encourage patient and family to use good hand hygiene technique  - Identify and instruct in appropriate isolation precautions for identified infection/condition  Outcome: Progressing  Goal: Absence of fever/infection during neutropenic period  Description: INTERVENTIONS:  - Monitor WBC    Outcome: Progressing     Problem: SAFETY ADULT  Goal: Patient will remain free of falls  Description: INTERVENTIONS:  - Educate patient/family on patient safety including physical limitations  - Instruct patient to call for assistance with activity   - Consult OT/PT to assist with strengthening/mobility   - Keep Call bell within reach  - Keep bed low and locked with side rails adjusted as appropriate  - Keep care items and personal belongings within reach  - Initiate and maintain comfort rounds  - Apply yellow socks and bracelet for high fall risk patients  - Consider moving patient to room near nurses station  Outcome: Progressing  Goal: Maintain or return to baseline ADL function  Description: INTERVENTIONS:  -  Assess patient's ability to carry out ADLs; assess patient's baseline for ADL function and identify physical deficits which impact ability to perform ADLs (bathing, care of mouth/teeth, toileting, grooming, dressing, etc.)  - Assess/evaluate cause of self-care deficits   - Assess range of motion  - Assess patient's mobility; develop plan if impaired  - Assess patient's need for assistive devices and provide as appropriate  - Encourage maximum independence but intervene and supervise when necessary  - Involve family in performance of ADLs  - Assess for home care needs following discharge   - Consider OT consult to assist with ADL evaluation and planning for discharge  - Provide patient education as appropriate  Outcome: Progressing  Goal: Maintains/Returns to pre admission functional level  Description: INTERVENTIONS:  - Perform AM-PAC 6 Click Basic Mobility/ Daily Activity  assessment daily.  - Set and communicate daily mobility goal to care team and patient/family/caregiver.   - Collaborate with rehabilitation services on mobility goals if consulted  - Out of bed for toileting  - Record patient progress and toleration of activity level   Outcome: Progressing     Problem: Knowledge Deficit  Goal: Patient/family/caregiver demonstrates understanding of disease process, treatment plan, medications, and discharge instructions  Description: Complete learning assessment and assess knowledge base.  Interventions:  - Provide teaching at level of understanding  - Provide teaching via preferred learning methods  Outcome: Progressing     Problem: DISCHARGE PLANNING  Goal: Discharge to home or other facility with appropriate resources  Description: INTERVENTIONS:  - Identify barriers to discharge w/patient and caregiver  - Arrange for needed discharge resources and transportation as appropriate  - Identify discharge learning needs (meds, wound care, etc.)  - Arrange for interpretive services to assist at discharge as needed  - Refer to Case Management Department for coordinating discharge planning if the patient needs post-hospital services based on physician/advanced practitioner order or complex needs related to functional status, cognitive ability, or social support system  Outcome: Progressing

## 2024-12-13 NOTE — ED PROVIDER NOTES
Time reflects when diagnosis was documented in both MDM as applicable and the Disposition within this note       Time User Action Codes Description Comment    12/13/2024  3:47 AM Martin Ortega Add [O26.893,  R10.9] Abdominal pain during pregnancy in third trimester     12/13/2024  3:47 AM Sukhdeep Nichols Add [O42.92] Full-term premature rupture of membranes     12/13/2024  3:47 AM Sukhdeep Nichols Remove [O42.92] Full-term premature rupture of membranes           ED Disposition       ED Disposition   Transfer to Another Facility-In Network    Condition   --    Date/Time   Fri Dec 13, 2024  3:46 AM    Comment   Rosy Peres should be transferred out to [facility].               Assessment & Plan       Medical Decision Making  Patient is 44-year-old female presenting for rupture of membranes.  DDx: Rupture membranes  Based on patient presentation and physical exam findings, primary concerns for rupture membranes, abdominal cramping.  Cardiac activity confirmed via ultrasound for baby.  Contacted OB, plans for immediate transfer priority 1 to [facility].  EMTALA signed, IV access obtained, transfer to [place].  Patient understands agrees with plan.    Problems Addressed:  Abdominal pain during pregnancy in third trimester: acute illness or injury        ED Course as of 12/13/24 0425   Fri Dec 13, 2024   0403 OB contacted, plans for transfer to [facility], transport bedside ready to take patient to [place].  Patient currently having no contractions, hemodynamically stable, fetal heart rate confirmed at 124 via ultrasound.  Clear fluid noted on vaginal exam, patient noted to be 3 cm dilated, able to palpate fetal head.       Medications - No data to display    ED Risk Strat Scores                                              History of Present Illness       Chief Complaint   Patient presents with    Rupture of Membranes     Patient reports water breaking approx 30 min ago, patient states she is 38  weeks pregnant.  Denies bleeding and contractions       Past Medical History:   Diagnosis Date    Asthma     pre-tenens - no inhaler use for years    Chlamydia     treated age 20    Heroin use     on subutex     Pregnancy complicated by subutex maintenance, antepartum (HCC)     Resolved:2016     (spontaneous vaginal delivery)     x 4    Varicella     pt had chickenpox childhood      Past Surgical History:   Procedure Laterality Date    INDUCED        x 3      Family History   Problem Relation Age of Onset    No Known Problems Mother     Stroke Father     Hypertension Father     Diabetes Maternal Grandmother         type 2    No Known Problems Maternal Grandfather     Diabetes Paternal Grandmother         type 2    Diabetes Other     Breast cancer Other         malignant neoplasm      Social History     Tobacco Use    Smoking status: Some Days     Current packs/day: 0.50     Types: Cigarettes    Smokeless tobacco: Never    Tobacco comments:     Smoking 10 cig/day currently in pregnancy   Vaping Use    Vaping status: Never Used   Substance Use Topics    Alcohol use: Not Currently     Comment: occasionaly    Drug use: Yes     Types: Heroin     Comment: Subutex 7-8 yrs ago      E-Cigarette/Vaping    E-Cigarette Use Never User       E-Cigarette/Vaping Substances      I have reviewed and agree with the history as documented.     HPI  Patient is a 44-year-old , approximately 38 weeks presenting for concerns of rupture membranes approximately 30 minutes prior to arrival to the ER.  Patient endorses is some lower abdominal cramping.  Patient has been receiving routine prenatal care, had ultrasound done yesterday that confirmed normal intrauterine pregnancy, no complications.  Patient states fluid was clear, continue fluid leakage that is clear on inspection.  No other complaints.  Review of Systems   Constitutional: Negative.    HENT: Negative.     Eyes: Negative.    Respiratory: Negative.      Cardiovascular: Negative.    Gastrointestinal: Negative.    Endocrine: Negative.    Genitourinary:         Rupture membranes, abdominal cramping   Musculoskeletal: Negative.    Skin: Negative.    Allergic/Immunologic: Negative.    Neurological: Negative.    Hematological: Negative.    Psychiatric/Behavioral: Negative.     All other systems reviewed and are negative.          Objective       ED Triage Vitals   Temperature Pulse Blood Pressure Respirations SpO2 Patient Position - Orthostatic VS   12/13/24 0349 12/13/24 0342 12/13/24 0339 12/13/24 0339 12/13/24 0342 --   97.9 °F (36.6 °C) (!) 120 169/75 20 100 %       Temp Source Heart Rate Source BP Location FiO2 (%) Pain Score    12/13/24 0349 12/13/24 0339 -- -- 12/13/24 0339    Oral Monitor   4      Vitals      Date and Time Temp Pulse SpO2 Resp BP Pain Score FACES Pain Rating User   12/13/24 0349 97.9 °F (36.6 °C) -- -- -- -- -- -- OB   12/13/24 0342 -- 120 100 % -- -- -- -- KG   12/13/24 0339 -- -- -- 20 169/75 4 -- KG            Physical Exam  Vitals and nursing note reviewed.   Constitutional:       Appearance: Normal appearance. She is normal weight.   HENT:      Head: Normocephalic and atraumatic.      Right Ear: Tympanic membrane, ear canal and external ear normal.      Left Ear: Tympanic membrane, ear canal and external ear normal.      Nose: Nose normal.      Mouth/Throat:      Mouth: Mucous membranes are moist.      Pharynx: Oropharynx is clear.   Eyes:      Extraocular Movements: Extraocular movements intact.      Conjunctiva/sclera: Conjunctivae normal.      Pupils: Pupils are equal, round, and reactive to light.   Cardiovascular:      Rate and Rhythm: Normal rate and regular rhythm.      Pulses: Normal pulses.      Heart sounds: Normal heart sounds.   Pulmonary:      Effort: Pulmonary effort is normal.      Breath sounds: Normal breath sounds.   Abdominal:      General: Bowel sounds are normal.      Palpations: Abdomen is soft.      Tenderness: There  is no abdominal tenderness. There is no guarding or rebound.      Comments: Appropriately distended for 38 weeks.   Genitourinary:     Comments: Clear vaginal fluid noted, patient is approximately 3 cm dilated, able to palpate fetal head.  Musculoskeletal:         General: Normal range of motion.      Cervical back: Normal range of motion and neck supple.   Skin:     General: Skin is warm and dry.      Capillary Refill: Capillary refill takes less than 2 seconds.   Neurological:      General: No focal deficit present.      Mental Status: She is alert and oriented to person, place, and time.   Psychiatric:         Mood and Affect: Mood normal.         Behavior: Behavior normal.         Thought Content: Thought content normal.         Judgment: Judgment normal.         Results Reviewed       None            No orders to display       POC Pelvic US    Date/Time: 12/13/2024 4:22 AM    Performed by: Kash Bar MD  Authorized by: Kash Bar MD    Patient location:  ED  Other Assisting Provider: No    Procedure details:     Exam Type:  Diagnostic    Indications comment:  Rupture membranes    Assessment for: evaluate fetal viability      Technique:  Transabdominal obstetric (HCG+) exam    Views obtained: uterus (transverse and sagittal)      Image quality: limited diagnostic      Image availability:  Images available in PACS  Uterine findings:     Intrauterine pregnancy: identified      Fetal heart rate: identified      Fetal heart rate (bpm):  124  Interpretation:     Ultrasound impressions: normal      Pregnancy findings: intrauterine pregnancy (IUP)        ED Medication and Procedure Management   Prior to Admission Medications   Prescriptions Last Dose Informant Patient Reported? Taking?   Prenat MV-Min w/Fe-Folate-DHA (PRENATAL COMPLETE PO)   Yes No   Sig: Take by mouth   aspirin 81 mg chewable tablet   No No   Sig: Chew 1 tablet (81 mg total) daily   Patient not taking: Reported on 12/12/2024   calcium carbonate  (TUMS) 500 mg chewable tablet   Yes No   Sig: Chew 1 tablet daily   famotidine (PEPCID) 20 mg tablet   No No   Sig: Take 1 tablet (20 mg total) by mouth daily   Patient not taking: Reported on 9/25/2024      Facility-Administered Medications: None     Discharge Medication List as of 12/13/2024  4:07 AM        CONTINUE these medications which have NOT CHANGED    Details   aspirin 81 mg chewable tablet Chew 1 tablet (81 mg total) daily, Starting Thu 9/19/2024, Until Wed 12/18/2024, Normal      calcium carbonate (TUMS) 500 mg chewable tablet Chew 1 tablet daily, Historical Med      famotidine (PEPCID) 20 mg tablet Take 1 tablet (20 mg total) by mouth daily, Starting Thu 9/19/2024, Until Sat 10/19/2024, Normal      Prenat MV-Min w/Fe-Folate-DHA (PRENATAL COMPLETE PO) Take by mouth, Historical Med           No discharge procedures on file.  ED SEPSIS DOCUMENTATION   Time reflects when diagnosis was documented in both MDM as applicable and the Disposition within this note       Time User Action Codes Description Comment    12/13/2024  3:47 AM Martin Ortega Add [O26.893,  R10.9] Abdominal pain during pregnancy in third trimester     12/13/2024  3:47 AM Sukhdeep Nichols Add [O42.92] Full-term premature rupture of membranes     12/13/2024  3:47 AM Sukhdeep Nichols Remove [O42.92] Full-term premature rupture of membranes                  Kash Bar MD  12/13/24 0425

## 2024-12-13 NOTE — L&D DELIVERY NOTE
Vaginal Delivery Summary - OB/GYN   Rosy Peres 44 y.o. female MRN: [id number]  Unit/Bed#: -01 Encounter: [id number]          Predelivery Diagnosis:  1. Pregnancy at 36 weeks  2. PPROM  3. Elevated bp w/o diagnosis  4. Polyhydramnios  5. HSV exposure  6. AMA  7. Hx of substance use  8. Tobacco use in pregnancy     Postdelivery Diagnosis:  1. Same as above  2. Delivery of female      Procedure: Spontaneous Vaginal Delivery    Attending: Dr. Weaver    Assistant: Dr. Carter    Anesthesia: Epidural    QBL: 90cc  Admission H.5  Admission platelets: 320    Complications: none apparent    Specimens: cord blood, arterial and venous cord blood gasses, placenta to storage    Findings:   1. Viable female at 1545, with APGARS of 9 and 9 at 1 and 5 minutes respectively,  2. Spontaneous delivery of intact placenta at 1549  3. No lacerations  4. Blood gases:    Umbilical Cord Venous Blood Gas:      Umbilical Cord Arterial Blood Gas:        Disposition:  Patient tolerated the procedure well and was recovering in labor and delivery room     Brief history and labor course:  Ms. Rosy Peres is a 44 y.o.  at 36wk4d. She presented to labor and delivery for PPROM. She was 3/60/-2 on exam. Pitocin was started. She received an epidural for pain control. She progressed to completion and began pushing.    Description of procedure    After pushing for 6 minutes, at 1545 patient delivered a viable female , wt pending, apgars of 9 (1 min) and 9 (5 min). The fetal vertex delivered spontaneously. Baby was checked for nuchal. None were noted. The anterior shoulder delivered atraumatically with maternal expulsive forces and the assistance of downward traction. The posterior shoulder delivered with maternal expulsive forces and the assistance of upward traction. The remainder of the fetus delivered spontaneously.     Upon delivery, the infant was placed on the mothers abdomen and the cord was clamped and cut.  Delayed cord clamping was performed.  The infant was noted to cry spontaneously and was moving all extremities appropriately. There was no evidence for injury. Awaiting nurse resuscitators evaluated the . Arterial and venous cord blood gases and cord blood was collected for analysis. These were promptly sent to the lab. In the immediate post-partum, 30 units of IV pitocin was administered, and the uterus was noted to contract down well with massage and pitocin. The placenta delivered spontaneously at 1549 and was noted to have a centrally inserted 3 vessel cord.     The vagina, cervix, perineum, and rectum were inspected and there was noted to be no laceration.    At the conclusion of the procedure, all needle, sponge, and instrument counts were noted to be correct. Patient tolerated the procedure well and was allowed to recover in labor and delivery room with family and  before being transferred to the post-partum floor. Dr. Weaver was present and participated in all key portions of the case.        Leatha Carter MD  2024  3:57 PM

## 2024-12-13 NOTE — ANESTHESIA PREPROCEDURE EVALUATION
Procedure:  LABOR ANALGESIA    Relevant Problems   ANESTHESIA (within normal limits)      GYN   (+) 36 weeks gestation of pregnancy   (+) AMA (advanced maternal age) multigravida 35+      Last epidural worked well  Plts 320       Anesthesia Plan  ASA Score- 2     Anesthesia Type- epidural with ASA Monitors.         Additional Monitors:     Airway Plan:     Comment: Discussed process and risks of epidural placement, including bleeding, infection, spinal headache, nerve damage, nausea, hypotension, incomplete relief of pain or failure of epidural. Explained chance of needing to adjust or replace epidural, ability to use epidural for  if functioning, and chance of needing general anesthesia.    .       Plan Factors-    Chart reviewed.   Existing labs reviewed.                   Induction-     Postoperative Plan-     Perioperative Resuscitation Plan - Level 1 - Full Code.       Informed Consent- Anesthetic plan and risks discussed with patient.

## 2024-12-13 NOTE — PLAN OF CARE
Problem: BIRTH - VAGINAL/ SECTION  Goal: Fetal and maternal status remain reassuring during the birth process  Description: INTERVENTIONS:  - Monitor vital signs  - Monitor fetal heart rate  - Monitor uterine activity  - Monitor labor progression (vaginal delivery)  - DVT prophylaxis  - Antibiotic prophylaxis  Outcome: Progressing  Goal: Emotionally satisfying birthing experience for mother/fetus  Description: Interventions:  - Assess, plan, implement and evaluate the nursing care given to the patient in labor  - Advocate the philosophy that each childbirth experience is a unique experience and support the family's chosen level of involvement and control during the labor process   - Actively participate in both the patient's and family's teaching of the birth process  - Consider cultural, Latter day and age-specific factors and plan care for the patient in labor  Outcome: Progressing     Problem: PAIN - ADULT  Goal: Verbalizes/displays adequate comfort level or baseline comfort level  Description: Interventions:  - Encourage patient to monitor pain and request assistance  - Assess pain using appropriate pain scale  - Administer analgesics based on type and severity of pain and evaluate response  - Implement non-pharmacological measures as appropriate and evaluate response  - Consider cultural and social influences on pain and pain management  - Notify physician/advanced practitioner if interventions unsuccessful or patient reports new pain  Outcome: Progressing     Problem: INFECTION - ADULT  Goal: Absence or prevention of progression during hospitalization  Description: INTERVENTIONS:  - Assess and monitor for signs and symptoms of infection  - Monitor lab/diagnostic results  - Monitor all insertion sites, i.e. indwelling lines, tubes, and drains  - Monitor endotracheal if appropriate and nasal secretions for changes in amount and color  - [place] appropriate cooling/warming therapies per order  -  Administer medications as ordered  - Instruct and encourage patient and family to use good hand hygiene technique  - Identify and instruct in appropriate isolation precautions for identified infection/condition  Outcome: Progressing  Goal: Absence of fever/infection during neutropenic period  Description: INTERVENTIONS:  - Monitor WBC    Outcome: Progressing     Problem: SAFETY ADULT  Goal: Patient will remain free of falls  Description: INTERVENTIONS:  - Educate patient/family on patient safety including physical limitations  - Instruct patient to call for assistance with activity   - Consult OT/PT to assist with strengthening/mobility   - Keep Call bell within reach  - Keep bed low and locked with side rails adjusted as appropriate  - Keep care items and personal belongings within reach  - Initiate and maintain comfort rounds  - Make Fall Risk Sign visible to staff  - Apply yellow socks and bracelet for high fall risk patients  - Consider moving patient to room near nurses station  Outcome: Progressing  Goal: Maintain or return to baseline ADL function  Description: INTERVENTIONS:  -  Assess patient's ability to carry out ADLs; assess patient's baseline for ADL function and identify physical deficits which impact ability to perform ADLs (bathing, care of mouth/teeth, toileting, grooming, dressing, etc.)  - Assess/evaluate cause of self-care deficits   - Assess range of motion  - Assess patient's mobility; develop plan if impaired  - Assess patient's need for assistive devices and provide as appropriate  - Encourage maximum independence but intervene and supervise when necessary  - Involve family in performance of ADLs  - Assess for home care needs following discharge   - Consider OT consult to assist with ADL evaluation and planning for discharge  - Provide patient education as appropriate  Outcome: Progressing  Goal: Maintains/Returns to pre admission functional level  Description: INTERVENTIONS:  - Perform  AM-PAC 6 Click Basic Mobility/ Daily Activity assessment daily.  - Set and communicate daily mobility goal to care team and patient/family/caregiver.   - Collaborate with rehabilitation services on mobility goals if consulted  - Out of bed for toileting  - Record patient progress and toleration of activity level   Outcome: Progressing     Problem: Knowledge Deficit  Goal: Patient/family/caregiver demonstrates understanding of disease process, treatment plan, medications, and discharge instructions  Description: Complete learning assessment and assess knowledge base.  Interventions:  - Provide teaching at level of understanding  - Provide teaching via preferred learning methods  Outcome: Progressing     Problem: DISCHARGE PLANNING  Goal: Discharge to home or other facility with appropriate resources  Description: INTERVENTIONS:  - Identify barriers to discharge w/patient and caregiver  - Arrange for needed discharge resources and transportation as appropriate  - Identify discharge learning needs (meds, wound care, etc.)  - Arrange for interpretive services to assist at discharge as needed  - Refer to Case Management Department for coordinating discharge planning if the patient needs post-hospital services based on physician/advanced practitioner order or complex needs related to functional status, cognitive ability, or social support system  Outcome: Progressing

## 2024-12-13 NOTE — PLAN OF CARE
Problem: PAIN - ADULT  Goal: Verbalizes/displays adequate comfort level or baseline comfort level  Description: Interventions:  - Encourage patient to monitor pain and request assistance  - Assess pain using appropriate pain scale  - Administer analgesics based on type and severity of pain and evaluate response  - Implement non-pharmacological measures as appropriate and evaluate response  - Consider cultural and social influences on pain and pain management  - Notify physician/advanced practitioner if interventions unsuccessful or patient reports new pain  Outcome: Progressing     Problem: INFECTION - ADULT  Goal: Absence or prevention of progression during hospitalization  Description: INTERVENTIONS:  - Assess and monitor for signs and symptoms of infection  - Monitor lab/diagnostic results  - Monitor all insertion sites, i.e. indwelling lines, tubes, and drains  - Monitor endotracheal if appropriate and nasal secretions for changes in amount and color  - [place] appropriate cooling/warming therapies per order  - Administer medications as ordered  - Instruct and encourage patient and family to use good hand hygiene technique  - Identify and instruct in appropriate isolation precautions for identified infection/condition  Outcome: Progressing  Goal: Absence of fever/infection during neutropenic period  Description: INTERVENTIONS:  - Monitor WBC    Outcome: Progressing     Problem: SAFETY ADULT  Goal: Patient will remain free of falls  Description: INTERVENTIONS:  - Educate patient/family on patient safety including physical limitations  - Instruct patient to call for assistance with activity   - Consult OT/PT to assist with strengthening/mobility   - Keep Call bell within reach  - Keep bed low and locked with side rails adjusted as appropriate  - Keep care items and personal belongings within reach  - Initiate and maintain comfort rounds  Outcome: Progressing  Goal: Maintain or return to baseline ADL  function  Description: INTERVENTIONS:  -  Assess patient's ability to carry out ADLs; assess patient's baseline for ADL function and identify physical deficits which impact ability to perform ADLs (bathing, care of mouth/teeth, toileting, grooming, dressing, etc.)  - Assess/evaluate cause of self-care deficits   - Assess range of motion  - Assess patient's mobility; develop plan if impaired  - Assess patient's need for assistive devices and provide as appropriate  - Encourage maximum independence but intervene and supervise when necessary  - Involve family in performance of ADLs  - Assess for home care needs following discharge   - Consider OT consult to assist with ADL evaluation and planning for discharge  - Provide patient education as appropriate  Outcome: Progressing       Problem: Knowledge Deficit  Goal: Patient/family/caregiver demonstrates understanding of disease process, treatment plan, medications, and discharge instructions  Description: Complete learning assessment and assess knowledge base.  Interventions:  - Provide teaching at level of understanding  - Provide teaching via preferred learning methods  Outcome: Progressing     Problem: DISCHARGE PLANNING  Goal: Discharge to home or other facility with appropriate resources  Description: INTERVENTIONS:  - Identify barriers to discharge w/patient and caregiver  - Arrange for needed discharge resources and transportation as appropriate  - Identify discharge learning needs (meds, wound care, etc.)  - Arrange for interpretive services to assist at discharge as needed  - Refer to Case Management Department for coordinating discharge planning if the patient needs post-hospital services based on physician/advanced practitioner order or complex needs related to functional status, cognitive ability, or social support system  Outcome: Progressing

## 2024-12-13 NOTE — DISCHARGE INSTR - AVS FIRST PAGE
Discharge instructions:   -Do not place anything (no partner, sex toys, tampons, douche, etc.) in your vagina for 6 weeks  -You may walk for exercise for the first 6 weeks then gradually return to your usual activities   -Please do not drive for 1 week if you have no stitches and for 2 weeks if you have stitches    -Please do not drive if you are taking any narcotic medications  -You may take baths or shower per your preference   -Please examine your breasts in the mirror daily and call your doctor for redness, tenderness, increased warmth, fevers, or chills  -Please call your doctor's office for temperature > 100.4*F or 38*C, worsening pain, increased bleeding (filling 2 or more maxi pads within 1 hr or less for at least 2 hrs), foul-smelling discharge/drainage, burning with urination, or symptoms of depression

## 2024-12-13 NOTE — EMTALA/ACUTE CARE TRANSFER
[facility] EMERGENCY DEPARTMENT  [address] [address]  Dept: [phone]      EMTALA TRANSFER CONSENT    NAME Rosy Peres                                         1980                              MRN [id number]    I have been informed of my rights regarding examination, treatment, and transfer   by Dr. Martin Ortega MD    Benefits: Specialized equipment and/or services available at the receiving facility (Include comment)________________________    Risks: Potential for delay in receiving treatment      Consent for Transfer:  I acknowledge that my medical condition has been evaluated and explained to me by the emergency department physician or other qualified medical person and/or my attending physician, who has recommended that I be transferred to the service of  Accepting Physician: Dr. Hussein at Accepting Facility Name, City & State : [facility]. The above potential benefits of such transfer, the potential risks associated with such transfer, and the probable risks of not being transferred have been explained to me, and I fully understand them.  The doctor has explained that, in my case, the benefits of transfer outweigh the risks.  I agree to be transferred.    I authorize the performance of emergency medical procedures and treatments upon me in both transit and upon arrival at the receiving facility.  Additionally, I authorize the release of any and all medical records to the receiving facility and request they be transported with me, if possible.  I understand that the safest mode of transportation during a medical emergency is an ambulance and that the Hospital advocates the use of this mode of transport. Risks of traveling to the receiving facility by car, including absence of medical control, life sustaining equipment, such as oxygen, and medical personnel has been explained to me and I fully understand them.    (ANDRÉS CORRECT BOX BELOW)  [  ]  I consent to  the stated transfer and to be transported by ambulance/helicopter.  [  ]  I consent to the stated transfer, but refuse transportation by ambulance and accept full responsibility for my transportation by car.  I understand the risks of non-ambulance transfers and I exonerate the Hospital and its staff from any deterioration in my condition that results from this refusal.    X___________________________________________    DATE  24  TIME________  Signature of patient or legally responsible individual signing on patient behalf           RELATIONSHIP TO PATIENT_________________________          Provider Certification    NAME Rosy Peres                                         1980                              MRN [id number]    A medical screening exam was performed on the above named patient.  Based on the examination:    Condition Necessitating Transfer The encounter diagnosis was Abdominal pain during pregnancy in third trimester.    Patient Condition: An emergency transfer is being made prior to stabilization due to the need for definitive care and the benefit of transfer outweighs the risk, Pregnant woman having contractions    Reason for Transfer: Level of Care needed not available at this facility    Transfer Requirements: Facility [facility]   Space available and qualified personnel available for treatment as acknowledged by    Agreed to accept transfer and to provide appropriate medical treatment as acknowledged by       Dr. Hussein  Appropriate medical records of the examination and treatment of the patient are provided at the time of transfer   STAFF INITIAL WHEN COMPLETED _______  Transfer will be performed by qualified personnel from    and appropriate transfer equipment as required, including the use of necessary and appropriate life support measures.    Provider Certification: I have examined the patient and explained the following risks and benefits of being transferred/refusing transfer to  the patient/family:  General risk, such as traffic hazards, adverse weather conditions, rough terrain or turbulence, possible failure of equipment (including vehicle or aircraft), or consequences of actions of persons outside the control of the transport personnel, Unanticipated needs of medical equipment and personnel during transport      Based on these reasonable risks and benefits to the patient and/or the unborn child(auzl), and based upon the information available at the time of the patient’s examination, I certify that the medical benefits reasonably to be expected from the provision of appropriate medical treatments at another medical facility outweigh the increasing risks, if any, to the individual’s medical condition, and in the case of labor to the unborn child, from effecting the transfer.    X____________________________________________ DATE 12/13/24        TIME_______      ORIGINAL - SEND TO MEDICAL RECORDS   COPY - SEND WITH PATIENT DURING TRANSFER

## 2024-12-13 NOTE — EMTALA/ACUTE CARE TRANSFER
[facility] EMERGENCY DEPARTMENT  [address] [address]  Dept: [phone]      EMTALA TRANSFER CONSENT    NAME Rosy Peres                                         1980                              MRN [id number]    I have been informed of my rights regarding examination, treatment, and transfer   by Dr. Martin Ortega MD    Benefits:      Risks:        Consent for Transfer:  I acknowledge that my medical condition has been evaluated and explained to me by the emergency department physician or other qualified medical person and/or my attending physician, who has recommended that I be transferred to the service of    at  . The above potential benefits of such transfer, the potential risks associated with such transfer, and the probable risks of not being transferred have been explained to me, and I fully understand them.  The doctor has explained that, in my case, the benefits of transfer outweigh the risks.  I agree to be transferred.    I authorize the performance of emergency medical procedures and treatments upon me in both transit and upon arrival at the receiving facility.  Additionally, I authorize the release of any and all medical records to the receiving facility and request they be transported with me, if possible.  I understand that the safest mode of transportation during a medical emergency is an ambulance and that the Hospital advocates the use of this mode of transport. Risks of traveling to the receiving facility by car, including absence of medical control, life sustaining equipment, such as oxygen, and medical personnel has been explained to me and I fully understand them.    (ANDRÉS CORRECT BOX BELOW)  [  ]  I consent to the stated transfer and to be transported by ambulance/helicopter.  [  ]  I consent to the stated transfer, but refuse transportation by ambulance and accept full responsibility for my transportation by car.  I understand the risks of  non-ambulance transfers and I exonerate the Hospital and its staff from any deterioration in my condition that results from this refusal.    X___________________________________________    DATE  24  TIME________  Signature of patient or legally responsible individual signing on patient behalf           RELATIONSHIP TO PATIENT_________________________          Provider Certification    NAME Rosy Peres                                         1980                              MRN [id number]    A medical screening exam was performed on the above named patient.  Based on the examination:    Condition Necessitating Transfer The encounter diagnosis was Abdominal pain during pregnancy in third trimester.    Patient Condition:      Reason for Transfer:      Transfer Requirements: Facility     Space available and qualified personnel available for treatment as acknowledged by    Agreed to accept transfer and to provide appropriate medical treatment as acknowledged by          Appropriate medical records of the examination and treatment of the patient are provided at the time of transfer   STAFF INITIAL WHEN COMPLETED _______  Transfer will be performed by qualified personnel from    and appropriate transfer equipment as required, including the use of necessary and appropriate life support measures.    Provider Certification: I have examined the patient and explained the following risks and benefits of being transferred/refusing transfer to the patient/family:         Based on these reasonable risks and benefits to the patient and/or the unborn child(azul), and based upon the information available at the time of the patient’s examination, I certify that the medical benefits reasonably to be expected from the provision of appropriate medical treatments at another medical facility outweigh the increasing risks, if any, to the individual’s medical condition, and in the case of labor to the unborn child, from effecting  the transfer.    X____________________________________________ DATE 12/13/24        TIME_______      ORIGINAL - SEND TO MEDICAL RECORDS   COPY - SEND WITH PATIENT DURING TRANSFER

## 2024-12-13 NOTE — ED ATTENDING ATTESTATION
2024  I, Martin Ortega MD, saw and evaluated the patient. I have discussed the patient with the resident/non-physician practitioner and agree with the resident's/non-physician practitioner's findings, Plan of Care, and MDM as documented in the resident's/non-physician practitioner's note, except where noted. All available labs and Radiology studies were reviewed.  I was present for key portions of any procedure(s) performed by the resident/non-physician practitioner and I was immediately available to provide assistance.       At this point I agree with the current assessment done in the Emergency Department.  I have conducted an independent evaluation of this patient a history and physical is as follows:    Final Diagnosis:  1. Abdominal pain during pregnancy in third trimester      Chief Complaint   Patient presents with    Rupture of Membranes     Patient reports water breaking approx 30 min ago, patient states she is 38 weeks pregnant.  Denies bleeding and contractions           A:  -44-year-old female G6, P5 at approximately 38 weeks gestation who presents with rupture of membranes.      P:  -Patient dilated approximately 3 cm on cervical check.  Amniotic fluid appears clear.  Will obtain IV access and initiate priority 1 transfer.      H:    44-year-old female G6, P5 at approximately 38 weeks gestation who presents with rupture of membranes and abdominal cramping.  Patient thought she was delivering here, but did not realize that we do not have OB/GYN.  Her OB is [facility].      PMH:  Past Medical History:   Diagnosis Date    Asthma     pre-tenens - no inhaler use for years    Chlamydia     treated age 20    Heroin use     on subutex     Pregnancy complicated by subutex maintenance, antepartum (HCC)     Resolved:2016     (spontaneous vaginal delivery)     x 4    Varicella     pt had chickenpox childhood       PSH:  Past Surgical History:   Procedure Laterality Date    INDUCED         x 3         PE:   Vitals:    12/13/24 0339 12/13/24 0342   BP: 169/75    Pulse:  (!) 120   Resp: 20    SpO2:  100%         Constitutional: Vital signs are normal. She appears well-developed. She is cooperative. No distress.   Cardiovascular: Tachycardic, regular rhythm, normal heart sounds.   No murmur heard.  Pulmonary/Chest: Effort normal and breath sounds normal.   Abdominal: Soft.  Gravid abdomen.   Neurological: She is alert.   Skin: Skin is warm, dry and intact.           - 13 point ROS was performed and all are normal unless stated in the history above.   - Nursing note reviewed. Vitals reviewed.   - Orders placed by myself and/or advanced practitioner / resident.    - Previous chart was reviewed  - No language barrier.   - History obtained from patient.   - There are no limitations to the history obtained. Reasons ROS could not be obtained:  N/A         Medications - No data to display  No orders to display     Orders Placed This Encounter   Procedures    Transfer to other facility     Labs Reviewed - No data to display  Time reflects when diagnosis was documented in both MDM as applicable and the Disposition within this note       Time User Action Codes Description Comment    12/13/2024  3:47 AM Martin Ortega Add [O26.893,  R10.9] Abdominal pain during pregnancy in third trimester           ED Disposition       ED Disposition   Transfer to Another Facility-In Network    Condition   --    Date/Time   Fri Dec 13, 2024  3:46 AM    Comment   Rosy Peres should be transferred out to [facility].               Follow-up Information    None       Patient's Medications   Discharge Prescriptions    No medications on file     No discharge procedures on file.  Prior to Admission Medications   Prescriptions Last Dose Informant Patient Reported? Taking?   Prenat MV-Min w/Fe-Folate-DHA (PRENATAL COMPLETE PO)   Yes No   Sig: Take by mouth   aspirin 81 mg chewable tablet   No No   Sig: Chew 1 tablet (81 mg total)  "daily   Patient not taking: Reported on 12/12/2024   calcium carbonate (TUMS) 500 mg chewable tablet   Yes No   Sig: Chew 1 tablet daily   famotidine (PEPCID) 20 mg tablet   No No   Sig: Take 1 tablet (20 mg total) by mouth daily   Patient not taking: Reported on 9/25/2024      Facility-Administered Medications: None       Portions of the record may have been created with voice recognition software. Occasional wrong word or \"sound a like\" substitutions may have occurred due to the inherent limitations of voice recognition software. Read the chart carefully and recognize, using context, where substitutions have occurred.       ED Course         Critical Care Time  Procedures      "

## 2024-12-13 NOTE — ASSESSMENT & PLAN NOTE
Continue routine post partum care  Encourage ambulation  Encourage breastfeeding  Contraception: Condoms - Vasectomy  Anticipate discharge PPD 1 vs 2

## 2024-12-13 NOTE — ASSESSMENT & PLAN NOTE
Preeclampsia wo severe features  - CBC, CMP wnl UPC, 0.5  - continue to monitor BP    Systolic (12hrs), Av , Min:128 , Max:137   Diastolic (12hrs), Av, Min:62, Max:82

## 2024-12-13 NOTE — DISCHARGE SUMMARY
Discharge Summary - OB/GYN  Rosy Peres 44 y.o. female MRN: [id number]  Unit/Bed#: -01 Encounter: [id number]    Admission Date: 2024     Discharge Date: 24    Admitting Attending: Gail Zamora MD    Delivering Attending: Dr. Weaver    Discharging Attending: Sera    Principal Diagnosis:   1. Pregnancy at 36 weeks  2. PPROM  3. Elevated bp w/o diagnosis  4. Polyhydramnios  5. HSV exposure  6. AMA  7. Hx of substance use  8. Tobacco use in pregnancy     Secondary Diagnosis:   1. Same as above, delivered    Procedures: spontaneous vaginal delivery    Anesthesia: epidural    Hospital course: Ms. Rosy Peres is a 44 y.o.  at 36wk4d. She presented to labor and delivery for PPROM. She was 3/60/-2 on exam. Pitocin was started. She received an epidural for pain control. She progressed to completion and began pushing.       She delivered a viable female  on 2024 at 1545. Weight 7lbs 8.8oz via normal spontaneous vaginal delivery.  Apgars were 9 (1 min) and 9 (5 min).  was transferred to  nursery. Patient tolerated the procedure well.     Her post-delivery course was complicated by no problems. Her postpartum pain was well controlled with oral analgesics.    On day of discharge, she was ambulating and able to reasonably perform all ADLs. She was voiding and had appropriate bowel function. Pain was well controlled. She was discharged home on postpartum day #1 without complications. Patient was instructed to follow up with her OB as an outpatient and was given appropriate warnings to call provider if she develops signs of infection or uncontrolled pain.    Complications: none apparent    Condition at discharge: good     Discharge instructions/Information to patient and family:   See after visit summary for information provided to patient and family.      Provisions for Follow-Up Care:  See after visit summary for information related to follow-up care and any pertinent home  health orders.      Disposition: See After Visit Summary for discharge disposition information.    Planned Readmission: No    Discharge medications and instructions:   Discharge instructions/Information to patient and family:   -Do not place anything (no partner, tampons or douche) in your vagina for 6 weeks.  -You may walk for exercise for the first 6 weeks then gradually return to your usual activities.   -Please do not drive for 1 week if you have no stitches and for 2 weeks if you have stitches or underwent a  delivery.    -You may take baths or shower per your preference.   -Please look at your bust (breasts) in the mirror daily and call for redness or tenderness or increased warmth.   -Please call us for temperature > 100.4*F or 38* C, worsening pain or a foul discharge.      Discharge Medications:   Prenatal vitamin daily for 6 months or the duration of nursing whichever is longer.  Motrin 600 mg orally every 6 hours as needed for pain  Tylenol (over the counter) per bottle directions as needed for pain: do NOT use with percocet  Hydrocortisone cream 1% (over the counter) applied 1-2x daily to hemorrhoids as needed    Case and note reviewed agree.

## 2024-12-13 NOTE — ANESTHESIA PROCEDURE NOTES
Epidural Block    Patient location during procedure: OB/L&D  Start time: 12/13/2024 11:53 AM  Reason for block: procedure for pain  Staffing  Performed by: Marjorie Raphael CRNA  Authorized by: Marjorie Raphael CRNA    Preanesthetic Checklist  Completed: patient identified, IV checked, site marked, risks and benefits discussed, surgical consent, monitors and equipment checked, pre-op evaluation and timeout performed  Epidural  Patient position: sitting  Prep: ChloraPrep  Sedation Level: no sedation  Patient monitoring: frequent blood pressure checks, continuous pulse oximetry and heart rate  Approach: midline  Location: lumbar,  Injection technique: IHSAN saline  Needle  Needle type: Tuohy   Needle gauge: 18 G  Needle insertion depth: 7 cm  Catheter type: multi-orifice  Catheter size: 20 G  Catheter at skin depth: 10 cm  Catheter securement method: stabilization device and clear occlusive dressing  Test dose: negativebupivacaine (PF) (MARCAINE) 0.25 % injection 10 mL - Epidural   10 mL - 12/13/2024 11:59:00 AM  Assessment  Sensory level: [id number] aspiration for CSF, negative aspiration for heme and no paresthesia on injection  patient tolerated the procedure well with no immediate complications

## 2024-12-13 NOTE — OB LABOR/OXYTOCIN SAFETY PROGRESS
Oxytocin Safety Progress Check Note - Rosy Peres 44 y.o. female MRN: [id number]    Unit/Bed#: -01 Encounter: [id number]    Dose (axel-units/min) Oxytocin: 4 axel-units/min  Contraction Frequency (minutes): 1-4  Contraction Intensity: Mild/Moderate  Uterine Activity Characteristics: Irregular  Cervical Dilation: 3        Cervical Effacement: 60  Fetal Station: -2  Baseline Rate (FHR): 130 bpm  Fetal Heart Rate (FHT): 141 BPM  FHR Category: I               Vital Signs:   Vitals:    12/13/24 1020   BP: 139/68   Pulse: 93   Resp:    Temp:    SpO2:        Notes/comments:   Feeling well at this time.  FHT category 1.  She is elen every 3 to 5 minutes.  SVE deferred at this time as Pitocin was started approximately 30 minutes ago.  Plan for next SVE in 2 to 4 hours or sooner if clinically indicated.    Jacqueline Roque MD 12/13/2024 10:26 AM

## 2024-12-13 NOTE — ED NOTES
Report called to KATHERINE Cervantes&JAYASHREE RN at [facility]     Ashly Canales RN  12/13/24 [id number]

## 2024-12-13 NOTE — OB LABOR/OXYTOCIN SAFETY PROGRESS
Labor Progress Note - Rosy Peres 44 y.o. female MRN: [id number]    Unit/Bed#: -01 Encounter: [id number]    Dose (axel-units/min) Oxytocin: 6 axel-units/min  Contraction Frequency (minutes): 1-3  Contraction Intensity: Mild/Moderate  Uterine Activity Characteristics: Regular  Cervical Dilation: 10  Dilation Complete Date: 12/13/24  Dilation Complete Time: 1517  Cervical Effacement: 100  Fetal Station: 1  Baseline Rate (FHR): 130 bpm  Fetal Heart Rate (FHT): 131 BPM  FHR Category: ii               Vital Signs:   Vitals:    12/13/24 1500   BP: 120/58   Pulse: 103   Resp:    Temp:    SpO2:        Notes/comments:   C/c/+1, KERRIE  Isolated variable with SVE, otherwise moderate variability without further decels  repositioned  She will call partner and then we will start pushing      Pia Weaver MD 12/13/2024 3:19 PM

## 2024-12-13 NOTE — H&P
"H & P- Obstetrics   Rosy Peres 44 y.o. female MRN: [id number]  Unit/Bed#: -01 Encounter: [id number]    Assessment: 44 y.o.  at 36w4d admitted for  premature rupture of membranes.  SVE: 3/60/-2  FHT: intermittent late decels  [place]: irritable, ctx q4min    Clinical EFW: 81%ile @33w2d  Presentation: cephalic, confirmed by US on 24  GBS status: unknown    Plan:   *  premature rupture of membranes (PPROM) with unknown onset of labor  Assessment & Plan  Patient grossly ruptured, clear odorless fluid since 315 today. SVE 3/60/-2, toco irritable with contractions q4min, FHT with intermittent late decelerations  - admit to labor & delivery floor  - IV fluids, clear liquid diet  - labs: CBC, T/S, RPR  - PCN for GBS prophylaxis given GBS unknown status and  gestational age  - analgesia at maternal request  - management: expectant, anticipate       Elevated blood pressure reading without diagnosis of hypertension  Assessment & Plan  Multiple mild range blood pressures on admission to triage. Asymptomatic  - f/u CBC, CMP, P:C  - continue to monitor BP  - continue to monitor for signs/symptoms of evolving preeclampsia      36 weeks gestation of pregnancy  Assessment & Plan  See \"PPROM\" Problem above    Polyhydramnios affecting pregnancy  Assessment & Plan  HAIDER 24.2cm on 24    Exposure to herpes simplex virus (HSV)  Assessment & Plan  No lesions on speculum exam    AMA (advanced maternal age) multigravida 35+  Assessment & Plan  S/p aspirin 81mg    History of substance use disorder  Assessment & Plan  Prior hx heroin use    Tobacco use in pregnancy, antepartum  Assessment & Plan  Declines nicotine patch      Discussed case and plan w/ Dr. Zamora    Chief Concern: leaking fluid    HPI: Rosy Peres is a 44 y.o.  with an THIAGO of 2025, by Last Menstrual Period at 36w4d who is being admitted for  premature ruptue of membranes. History is significant for asthma (not on " meds), hx heroin use, current tobacco use, HSV. Pregnancy is complicated by advanced maternal age, polyhydramnios. She denies having uterine contractions, has large amounts of fluid leaking since approx 0315, and reports no VB. She states she has felt good FM.    Patient Active Problem List   Diagnosis    Tobacco use in pregnancy, antepartum    History of substance use disorder    AMA (advanced maternal age) multigravida 35+    Exposure to herpes simplex virus (HSV)    Heartburn in pregnancy in second trimester    Polyhydramnios affecting pregnancy    36 weeks gestation of pregnancy    Prenatal care, third trimester    Elevated blood pressure reading without diagnosis of hypertension     premature rupture of membranes (PPROM) with unknown onset of labor     OB Hx:  OB History    Para Term  AB Living   9 5 5 0 3 5   SAB IAB Ectopic Multiple Live Births   1 2 0 0 5      # Outcome Date GA Lbr Meet/2nd Weight Sex Type Anes PTL Lv   9 Current            8 SAB 23 10w0d          7 Term 17 39w6d / 00:04 4085 g (9 lb 0.1 oz) F Vag-Spont EPI N MARIAH   6 Term 12 39w0d  3345 g (7 lb 6 oz) F Vag-Spont  N    5 Term 06/10/09 38w0d  3799 g (8 lb 6 oz) M Vag-Spont  N    4 IAB  6w0d          3 IAB  6w0d          2 Term 01 39w5d  4366 g (9 lb 10 oz) F Vag-Spont EPI N    1 Term 99 39w5d  3345 g (7 lb 6 oz) F Vag-Spont EPI N       Birth Comments: [facility] (Dr Olivas)     Past Medical Hx:  Past Medical History:   Diagnosis Date    Asthma     pre-tenens - no inhaler use for years    Chlamydia     treated age 20    Heroin use     on subutex     Pregnancy complicated by subutex maintenance, antepartum (HCC)     Resolved:2016     (spontaneous vaginal delivery)     x 4    Varicella     pt had chickenpox childhood     Past Surgical hx:  Past Surgical History:   Procedure Laterality Date    INDUCED        x 3     Social History     Socioeconomic History    Marital status:  Single     Spouse name: Not on file    Number of children: Not on file    Years of education: Not on file    Highest education level: Not on file   Occupational History    Not on file   Tobacco Use    Smoking status: Every Day     Current packs/day: 0.50     Types: Cigarettes    Smokeless tobacco: Never    Tobacco comments:     Smoking 10 cig/day currently in pregnancy   Vaping Use    Vaping status: Never Used   Substance and Sexual Activity    Alcohol use: Not Currently     Comment: occasionaly    Drug use: Yes     Types: Heroin     Comment: Subutex 7-8 yrs ago    Sexual activity: Yes     Partners: Male     Birth control/protection: None   Other Topics Concern    Not on file   Social History Narrative    Feels safe at home     Social Drivers of Health     Financial Resource Strain: Not on file   Food Insecurity: No Food Insecurity (9/18/2024)    Nursing - Inadequate Food Risk Classification     Worried About Running Out of Food in the Last Year: Never true     Ran Out of Food in the Last Year: Never true     Ran Out of Food in the Last Year: Not on file   Transportation Needs: Unmet Transportation Needs (9/18/2024)    PRAPARE - Transportation     Lack of Transportation (Medical): Yes     Lack of Transportation (Non-Medical): No   Physical Activity: Not on file   Stress: Not on file   Social Connections: Not on file   Intimate Partner Violence: Not on file   Housing Stability: Low Risk  (9/18/2024)    Housing Stability Vital Sign     Unable to Pay for Housing in the Last Year: No     Number of Times Moved in the Last Year: 0     Homeless in the Last Year: No     No Known Allergies      Medications Prior to Admission:     calcium carbonate (TUMS) 500 mg chewable tablet    Prenat MV-Min w/Fe-Folate-DHA (PRENATAL COMPLETE PO)    aspirin 81 mg chewable tablet    famotidine (PEPCID) 20 mg tablet    Objective:  Temp:  [97.9 °F (36.6 °C)-98 °F (36.7 °C)] 98 °F (36.7 °C)  HR:  [111-120] 111  BP: (130-169)/(73-84)  152/73  Resp:  [20] 20  SpO2:  [98 %-100 %] 98 %  O2 Device: None (Room air)  Body mass index is 38.32 kg/m².     Physical Exam:  Physical Exam  Vitals reviewed.   Constitutional:       General: She is not in acute distress.     Appearance: She is well-developed.   HENT:      Head: Normocephalic and atraumatic.   Cardiovascular:      Rate and Rhythm: Tachycardia present.   Pulmonary:      Effort: Pulmonary effort is normal. No respiratory distress.   Genitourinary:     Comments: Normal appearing external female genitalia  Normal appearing urethral meatus  Speculum exam       Normal appearing vaginal epithelium, no herpetic lesions visualized       No bleeding        Pooling of clear fluid  SVE: 3/60/-2  Skin:     Findings: No rash (on exposed skin).   Neurological:      Mental Status: She is alert and oriented to person, place, and time.   Psychiatric:         Mood and Affect: Affect normal.         Speech: Speech normal.         Behavior: Behavior normal.        FHT:  Baseline: 150bpm  Variability: moderate  Accels: not present  Decels: intermittent lates    TOCO:   Irritable, ctx q4min    Lab Results   Component Value Date    WBC 19.22 (H) 10/16/2024    HGB 11.9 10/16/2024    HCT 36.4 10/16/2024     10/16/2024     Lab Results   Component Value Date    K 3.8 02/19/2017     02/19/2017    CO2 28 02/19/2017    BUN 4 (L) 02/19/2017    CREATININE 0.60 02/19/2017    AST 19 02/19/2017    ALT 23 02/19/2017     Prenatal Labs:   Blood type: AB pos  Antibody: neg  GBS: unknown  HIV: nr  Rubella: immune  Syphilis IgM/IgG: nr  HBsAg: nr  HCAb: nr  Chlamydia: neg  Gonorrhea: neg  Diabetes 1 hour screen: 103  3 hour glucose: n/a  Platelets: pending  Hgb: pending  Expected length of stay: >2 Midnights  Admission status: INPATIENT     Hetal Boyd MD  OBGYN PGY-1  12/13/24  5:42 AM

## 2024-12-13 NOTE — OB LABOR/OXYTOCIN SAFETY PROGRESS
Labor Progress Note - Rosy Peres 44 y.o. female MRN: [id number]    Unit/Bed#: -01 Encounter: [id number]       Contraction Frequency (minutes): 3-4  Contraction Intensity: Mild/Moderate  Uterine Activity Characteristics: Irregular  Cervical Dilation: 3        Cervical Effacement: 60  Fetal Station: -2  Baseline Rate (FHR): 130 bpm  Fetal Heart Rate (FHT): 137 BPM  FHR Category: I               Vital Signs:   Vitals:    12/13/24 0750   BP: 134/77   Pulse: (!) 115   Resp:    Temp:    SpO2:        Notes/comments:   Is feeling well at this time.  She is feeling some mild cramps but no regular contractions.  FHT has been category 1.  SVE unchanged as above.  Induction consent reviewed and signed.  Plan to start Pitocin.    Jacqueline Roque MD 12/13/2024 8:15 AM

## 2024-12-14 VITALS
HEART RATE: 93 BPM | SYSTOLIC BLOOD PRESSURE: 134 MMHG | WEIGHT: 252 LBS | TEMPERATURE: 97.6 F | OXYGEN SATURATION: 99 % | DIASTOLIC BLOOD PRESSURE: 75 MMHG | RESPIRATION RATE: 18 BRPM | HEIGHT: 68 IN | BODY MASS INDEX: 38.19 KG/M2

## 2024-12-14 PROBLEM — O13.9 GESTATIONAL HYPERTENSION: Status: ACTIVE | Noted: 2024-12-13

## 2024-12-14 PROBLEM — O14.90 PREECLAMPSIA: Status: ACTIVE | Noted: 2024-12-13

## 2024-12-14 PROCEDURE — 99024 POSTOP FOLLOW-UP VISIT: CPT | Performed by: OBSTETRICS & GYNECOLOGY

## 2024-12-14 RX ORDER — IBUPROFEN 600 MG/1
600 TABLET, FILM COATED ORAL EVERY 6 HOURS PRN
Start: 2024-12-14

## 2024-12-14 RX ORDER — ACETAMINOPHEN 325 MG/1
975 TABLET ORAL EVERY 6 HOURS PRN
Start: 2024-12-14

## 2024-12-14 RX ORDER — BENZOCAINE/MENTHOL 6 MG-10 MG
1 LOZENGE MUCOUS MEMBRANE DAILY PRN
Start: 2024-12-14

## 2024-12-14 RX ADMIN — IBUPROFEN 600 MG: 600 TABLET ORAL at 06:12

## 2024-12-14 RX ADMIN — ACETAMINOPHEN 975 MG: 325 TABLET, FILM COATED ORAL at 15:57

## 2024-12-14 RX ADMIN — IBUPROFEN 600 MG: 600 TABLET ORAL at 00:15

## 2024-12-14 RX ADMIN — IBUPROFEN 600 MG: 600 TABLET ORAL at 21:38

## 2024-12-14 RX ADMIN — ACETAMINOPHEN 975 MG: 325 TABLET, FILM COATED ORAL at 06:12

## 2024-12-14 RX ADMIN — IBUPROFEN 600 MG: 600 TABLET ORAL at 15:57

## 2024-12-14 RX ADMIN — DOCUSATE SODIUM 100 MG: 100 CAPSULE, LIQUID FILLED ORAL at 09:06

## 2024-12-14 RX ADMIN — CALCIUM CARBONATE (ANTACID) CHEW TAB 500 MG 1000 MG: 500 CHEW TAB at 05:23

## 2024-12-14 RX ADMIN — DOCUSATE SODIUM 100 MG: 100 CAPSULE, LIQUID FILLED ORAL at 21:37

## 2024-12-14 RX ADMIN — ACETAMINOPHEN 975 MG: 325 TABLET, FILM COATED ORAL at 00:15

## 2024-12-14 RX ADMIN — ACETAMINOPHEN 975 MG: 325 TABLET, FILM COATED ORAL at 21:38

## 2024-12-14 NOTE — PLAN OF CARE
Problem: PAIN - ADULT  Goal: Verbalizes/displays adequate comfort level or baseline comfort level  Description: Interventions:  - Encourage patient to monitor pain and request assistance  - Assess pain using appropriate pain scale  - Administer analgesics based on type and severity of pain and evaluate response  - Implement non-pharmacological measures as appropriate and evaluate response  - Consider cultural and social influences on pain and pain management  - Notify physician/advanced practitioner if interventions unsuccessful or patient reports new pain  Outcome: Progressing     Problem: INFECTION - ADULT  Goal: Absence or prevention of progression during hospitalization  Description: INTERVENTIONS:  - Assess and monitor for signs and symptoms of infection  - Monitor lab/diagnostic results  - Monitor all insertion sites, i.e. indwelling lines, tubes, and drains  - Monitor endotracheal if appropriate and nasal secretions for changes in amount and color  - [place] appropriate cooling/warming therapies per order  - Administer medications as ordered  - Instruct and encourage patient and family to use good hand hygiene technique  - Identify and instruct in appropriate isolation precautions for identified infection/condition  Outcome: Progressing  Goal: Absence of fever/infection during neutropenic period  Description: INTERVENTIONS:  - Monitor WBC    Outcome: Progressing     Problem: SAFETY ADULT  Goal: Patient will remain free of falls  Description: INTERVENTIONS:  - Educate patient/family on patient safety including physical limitations  - Instruct patient to call for assistance with activity   - Consult OT/PT to assist with strengthening/mobility   - Keep Call bell within reach  - Keep bed low and locked with side rails adjusted as appropriate  - Keep care items and personal belongings within reach  - Initiate and maintain comfort rounds  - Make Fall Risk Sign visible to staff  - Apply yellow socks and bracelet  for high fall risk patients  - Consider moving patient to room near nurses station  Outcome: Progressing  Goal: Maintain or return to baseline ADL function  Description: INTERVENTIONS:  -  Assess patient's ability to carry out ADLs; assess patient's baseline for ADL function and identify physical deficits which impact ability to perform ADLs (bathing, care of mouth/teeth, toileting, grooming, dressing, etc.)  - Assess/evaluate cause of self-care deficits   - Assess range of motion  - Assess patient's mobility; develop plan if impaired  - Assess patient's need for assistive devices and provide as appropriate  - Encourage maximum independence but intervene and supervise when necessary  - Involve family in performance of ADLs  - Assess for home care needs following discharge   - Consider OT consult to assist with ADL evaluation and planning for discharge  - Provide patient education as appropriate  Outcome: Progressing  Goal: Maintains/Returns to pre admission functional level  Description: INTERVENTIONS:  - Perform AM-PAC 6 Click Basic Mobility/ Daily Activity assessment daily.  - Set and communicate daily mobility goal to care team and patient/family/caregiver.   - Collaborate with rehabilitation services on mobility goals if consulted  - Out of bed for toileting  - Record patient progress and toleration of activity level   Outcome: Progressing     Problem: Knowledge Deficit  Goal: Patient/family/caregiver demonstrates understanding of disease process, treatment plan, medications, and discharge instructions  Description: Complete learning assessment and assess knowledge base.  Interventions:  - Provide teaching at level of understanding  - Provide teaching via preferred learning methods  Outcome: Progressing     Problem: DISCHARGE PLANNING  Goal: Discharge to home or other facility with appropriate resources  Description: INTERVENTIONS:  - Identify barriers to discharge w/patient and caregiver  - Arrange for needed  discharge resources and transportation as appropriate  - Identify discharge learning needs (meds, wound care, etc.)  - Arrange for interpretive services to assist at discharge as needed  - Refer to Case Management Department for coordinating discharge planning if the patient needs post-hospital services based on physician/advanced practitioner order or complex needs related to functional status, cognitive ability, or social support system  Outcome: Progressing     Problem: POSTPARTUM  Goal: Experiences normal postpartum course  Description: INTERVENTIONS:  - Monitor maternal vital signs  - Assess uterine involution and lochia  Outcome: Progressing  Goal: Appropriate maternal -  bonding  Description: INTERVENTIONS:  - Identify family support  - Assess for appropriate maternal/infant bonding   -Encourage maternal/infant bonding opportunities  - Referral to  or  as needed  Outcome: Progressing  Goal: Establishment of infant feeding pattern  Description: INTERVENTIONS:  - Assess breast/bottle feeding  - Refer to lactation as needed  Outcome: Progressing

## 2024-12-14 NOTE — PLAN OF CARE
Problem: PAIN - ADULT  Goal: Verbalizes/displays adequate comfort level or baseline comfort level  Description: Interventions:  - Encourage patient to monitor pain and request assistance  - Assess pain using appropriate pain scale  - Administer analgesics based on type and severity of pain and evaluate response  - Implement non-pharmacological measures as appropriate and evaluate response  - Consider cultural and social influences on pain and pain management  - Notify physician/advanced practitioner if interventions unsuccessful or patient reports new pain  Outcome: Progressing     Problem: INFECTION - ADULT  Goal: Absence or prevention of progression during hospitalization  Description: INTERVENTIONS:  - Assess and monitor for signs and symptoms of infection  - Monitor lab/diagnostic results  - Monitor all insertion sites, i.e. indwelling lines, tubes, and drains  - Monitor endotracheal if appropriate and nasal secretions for changes in amount and color  - [place] appropriate cooling/warming therapies per order  - Administer medications as ordered  - Instruct and encourage patient and family to use good hand hygiene technique  - Identify and instruct in appropriate isolation precautions for identified infection/condition  Outcome: Progressing  Goal: Absence of fever/infection during neutropenic period  Description: INTERVENTIONS:  - Monitor WBC    Outcome: Progressing     Problem: SAFETY ADULT  Goal: Patient will remain free of falls  Description: INTERVENTIONS:  - Educate patient/family on patient safety including physical limitations  - Instruct patient to call for assistance with activity   - Consult OT/PT to assist with strengthening/mobility   - Keep Call bell within reach  - Keep bed low and locked with side rails adjusted as appropriate  - Keep care items and personal belongings within reach  - Initiate and maintain comfort rounds  - Make Fall Risk Sign visible to staff  - Apply yellow socks and bracelet  for high fall risk patients  - Consider moving patient to room near nurses station  Outcome: Progressing  Goal: Maintain or return to baseline ADL function  Description: INTERVENTIONS:  -  Assess patient's ability to carry out ADLs; assess patient's baseline for ADL function and identify physical deficits which impact ability to perform ADLs (bathing, care of mouth/teeth, toileting, grooming, dressing, etc.)  - Assess/evaluate cause of self-care deficits   - Assess range of motion  - Assess patient's mobility; develop plan if impaired  - Assess patient's need for assistive devices and provide as appropriate  - Encourage maximum independence but intervene and supervise when necessary  - Involve family in performance of ADLs  - Assess for home care needs following discharge   - Consider OT consult to assist with ADL evaluation and planning for discharge  - Provide patient education as appropriate  Outcome: Progressing  Goal: Maintains/Returns to pre admission functional level  Description: INTERVENTIONS:  - Perform AM-PAC 6 Click Basic Mobility/ Daily Activity assessment daily.  - Set and communicate daily mobility goal to care team and patient/family/caregiver.   - Collaborate with rehabilitation services on mobility goals if consulted  - Out of bed for toileting  - Record patient progress and toleration of activity level   Outcome: Progressing     Problem: Knowledge Deficit  Goal: Patient/family/caregiver demonstrates understanding of disease process, treatment plan, medications, and discharge instructions  Description: Complete learning assessment and assess knowledge base.  Interventions:  - Provide teaching at level of understanding  - Provide teaching via preferred learning methods  Outcome: Progressing     Problem: DISCHARGE PLANNING  Goal: Discharge to home or other facility with appropriate resources  Description: INTERVENTIONS:  - Identify barriers to discharge w/patient and caregiver  - Arrange for needed  discharge resources and transportation as appropriate  - Identify discharge learning needs (meds, wound care, etc.)  - Arrange for interpretive services to assist at discharge as needed  - Refer to Case Management Department for coordinating discharge planning if the patient needs post-hospital services based on physician/advanced practitioner order or complex needs related to functional status, cognitive ability, or social support system  Outcome: Progressing     Problem: POSTPARTUM  Goal: Experiences normal postpartum course  Description: INTERVENTIONS:  - Monitor maternal vital signs  - Assess uterine involution and lochia  Outcome: Progressing  Goal: Appropriate maternal -  bonding  Description: INTERVENTIONS:  - Identify family support  - Assess for appropriate maternal/infant bonding   -Encourage maternal/infant bonding opportunities  - Referral to  or  as needed  Outcome: Progressing  Goal: Establishment of infant feeding pattern  Description: INTERVENTIONS:  - Assess breast/bottle feeding  - Refer to lactation as needed  Outcome: Progressing

## 2024-12-14 NOTE — PROGRESS NOTES
"Progress Note - OB/GYN   Name: Rosy Peres 44 y.o. female I MRN: [id number]  Unit/Bed#: -01 I Date of Admission: 2024   Date of Service: 2024 I Hospital Day: 1     Assessment & Plan   (spontaneous vaginal delivery)  Continue routine post partum care  Encourage ambulation  Encourage breastfeeding  Contraception: Condoms - Vasectomy  Anticipate discharge PPD 1 vs 2     Tobacco use in pregnancy, antepartum  Declines nicotine patch  History of substance use disorder  Prior hx heroin use  Exposure to herpes simplex virus (HSV)  No lesions on speculum exam  Preeclampsia  Preeclampsia wo severe features  - CBC, CMP wnl UPC, 0.5  - continue to monitor BP    Systolic (12hrs), Av , Min:128 , Max:137   Diastolic (12hrs), Av, Min:62, Max:82          Progress Note - OB/GYN   Rosy Peres 44 y.o. female MRN: [id number]  Unit/Bed#: -01 Encounter: [id number]    Assessment:  Post partum Day #1 s/p , stable, baby in room.         Subjective/Objective   Chief Complaint:     Post delivery. Patient is doing well. Lochia WNL. Pain well controlled.     Subjective:     Pain: yes, cramping, improved with meds  Tolerating PO: yes  Voiding: yes  Flatus: yes  Ambulating: yes  Chest pain: no  Shortness of breath: no  Leg pain: no  Lochia: minimal    Objective:     Vitals: /82 (BP Location: Left arm)   Pulse 81   Temp 97.8 °F (36.6 °C) (Oral)   Resp 20   Ht 5' 8\" (1.727 m)   Wt 114 kg (252 lb)   LMP 2024 (Exact Date)   SpO2 98%   Breastfeeding No   BMI 38.32 kg/m²     I/O          07 0700  07 0700 12/14 0701  12/15 07    Urine (mL/kg/hr)  400 (0.1)     Blood  90     Total Output  490     Net  -490                    Lab Results   Component Value Date    WBC 17.87 (H) 2024    HGB 9.5 (L) 2024    HCT 29.3 (L) 2024    MCV 93 2024     2024       Physical Exam:     Gen: AAOx3, NAD  CV: no acute distress  Lungs: no acute " distress  Abd: Soft, non-tender, non-distended, no rebound or guarding  Uterine fundus firm and non-tender, 1 cm below the umbilicus.  Ext: Non tender    Luz Marina Tate MD  OB GYN PGY1  12/14/24  7:42 AM

## 2024-12-15 NOTE — PLAN OF CARE
Problem: PAIN - ADULT  Goal: Verbalizes/displays adequate comfort level or baseline comfort level  Description: Interventions:  - Encourage patient to monitor pain and request assistance  - Assess pain using appropriate pain scale  - Administer analgesics based on type and severity of pain and evaluate response  - Implement non-pharmacological measures as appropriate and evaluate response  - Consider cultural and social influences on pain and pain management  - Notify physician/advanced practitioner if interventions unsuccessful or patient reports new pain  Outcome: Adequate for Discharge     Problem: INFECTION - ADULT  Goal: Absence or prevention of progression during hospitalization  Description: INTERVENTIONS:  - Assess and monitor for signs and symptoms of infection  - Monitor lab/diagnostic results  - Monitor all insertion sites, i.e. indwelling lines, tubes, and drains  - Monitor endotracheal if appropriate and nasal secretions for changes in amount and color  - Spring appropriate cooling/warming therapies per order  - Administer medications as ordered  - Instruct and encourage patient and family to use good hand hygiene technique  - Identify and instruct in appropriate isolation precautions for identified infection/condition  Outcome: Adequate for Discharge  Goal: Absence of fever/infection during neutropenic period  Description: INTERVENTIONS:  - Monitor WBC    Outcome: Adequate for Discharge     Problem: SAFETY ADULT  Goal: Patient will remain free of falls  Description: INTERVENTIONS:  - Educate patient/family on patient safety including physical limitations  - Instruct patient to call for assistance with activity   - Consult OT/PT to assist with strengthening/mobility   - Keep Call bell within reach  - Keep bed low and locked with side rails adjusted as appropriate  - Keep care items and personal belongings within reach  - Initiate and maintain comfort rounds  - Make Fall Risk Sign visible to  staff  - Apply yellow socks and bracelet for high fall risk patients  - Consider moving patient to room near nurses station  Outcome: Adequate for Discharge  Goal: Maintain or return to baseline ADL function  Description: INTERVENTIONS:  -  Assess patient's ability to carry out ADLs; assess patient's baseline for ADL function and identify physical deficits which impact ability to perform ADLs (bathing, care of mouth/teeth, toileting, grooming, dressing, etc.)  - Assess/evaluate cause of self-care deficits   - Assess range of motion  - Assess patient's mobility; develop plan if impaired  - Assess patient's need for assistive devices and provide as appropriate  - Encourage maximum independence but intervene and supervise when necessary  - Involve family in performance of ADLs  - Assess for home care needs following discharge   - Consider OT consult to assist with ADL evaluation and planning for discharge  - Provide patient education as appropriate  Outcome: Adequate for Discharge  Goal: Maintains/Returns to pre admission functional level  Description: INTERVENTIONS:  - Perform AM-PAC 6 Click Basic Mobility/ Daily Activity assessment daily.  - Set and communicate daily mobility goal to care team and patient/family/caregiver.   - Collaborate with rehabilitation services on mobility goals if consulted  - Out of bed for toileting  - Record patient progress and toleration of activity level   Outcome: Adequate for Discharge     Problem: Knowledge Deficit  Goal: Patient/family/caregiver demonstrates understanding of disease process, treatment plan, medications, and discharge instructions  Description: Complete learning assessment and assess knowledge base.  Interventions:  - Provide teaching at level of understanding  - Provide teaching via preferred learning methods  Outcome: Adequate for Discharge     Problem: DISCHARGE PLANNING  Goal: Discharge to home or other facility with appropriate resources  Description:  INTERVENTIONS:  - Identify barriers to discharge w/patient and caregiver  - Arrange for needed discharge resources and transportation as appropriate  - Identify discharge learning needs (meds, wound care, etc.)  - Arrange for interpretive services to assist at discharge as needed  - Refer to Case Management Department for coordinating discharge planning if the patient needs post-hospital services based on physician/advanced practitioner order or complex needs related to functional status, cognitive ability, or social support system  Outcome: Adequate for Discharge     Problem: POSTPARTUM  Goal: Experiences normal postpartum course  Description: INTERVENTIONS:  - Monitor maternal vital signs  - Assess uterine involution and lochia  Outcome: Adequate for Discharge  Goal: Appropriate maternal -  bonding  Description: INTERVENTIONS:  - Identify family support  - Assess for appropriate maternal/infant bonding   -Encourage maternal/infant bonding opportunities  - Referral to  or  as needed  Outcome: Adequate for Discharge  Goal: Establishment of infant feeding pattern  Description: INTERVENTIONS:  - Assess breast/bottle feeding  - Refer to lactation as needed  Outcome: Adequate for Discharge

## 2024-12-16 ENCOUNTER — TELEPHONE (OUTPATIENT)
Dept: OBGYN CLINIC | Facility: CLINIC | Age: 44
End: 2024-12-16

## 2024-12-16 ENCOUNTER — RESULTS FOLLOW-UP (OUTPATIENT)
Dept: OBGYN CLINIC | Facility: CLINIC | Age: 44
End: 2024-12-16

## 2024-12-16 NOTE — UTILIZATION REVIEW
"Mother and baby discharged on 12/15/2024    NOTIFICATION OF INPATIENT ADMISSION   MATERNITY/DELIVERY AUTHORIZATION REQUEST   SERVICING FACILITY:   [facility] - L&D, , NICU  [address] [place] [address]. [place] [address]  Tax ID: [id number]  NPI: [id number]   ATTENDING PROVIDER:  Attending Name and NPI#: Pia Weaver Md [[id number]]  Address: [address] [place] [address] [place] [address]  Phone: [phone]   ADMISSION INFORMATION:  Place of Service: Inpatient [facility]  Place of Service Code: 21  Inpatient Admission Date/Time: 24  5:16 AM  Discharge Date/Time: 12/15/2024 12:05 AM  Admitting Diagnosis Code/Description:  Pregnancy [Z34.90]  36 weeks gestation of pregnancy [Z3A.36]  Full-term premature rupture of membranes, unspecified as to length of time between rupture and onset of labor [O42.92]  Encounter for full-term uncomplicated delivery [O80]     Mother: Rosy Peres 1980 Estimated Date of Delivery: 25  Delivering clinician: Pia Weaver   OB History          9    Para   6    Term   5       1    AB   3    Living   6         SAB   1    IAB   2    Ectopic   0    Multiple   0    Live Births   6               [place] Name & MRN:   Information for the patient's :  Wendie Hernandez [[id number]]    Delivery Information:  Sex: female  Delivered 2024 3:45 PM by Vaginal, Spontaneous; Gestational Age: 36w4d    [place] Measurements:  Weight: 7 lb 8.8 oz (3425 g);  Height: 20.5\"    APGAR 1 minute 5 minutes 10 minutes   Totals: 9 9       UTILIZATION REVIEW CONTACT:  Evelyn Schuster, Utilization   Network Utilization Review Department  Phone: [phone]  Fax [phone]  Email: [email or web address]  Contact for approvals/pending authorizations, clinical reviews, and discharge.     PHYSICIAN ADVISORY SERVICES:  Medical Necessity Denial & Bilk-tp-Rlye Review  Phone: [phone]  Fax: " [phone]  Email: [email or web address]     DISCHARGE SUPPORT TEAM:  For Patients Discharge Needs & Updates  Phone: [phone] opt. 2 Fax: [phone]  Email: [email or web address]

## 2024-12-18 LAB — PLACENTA IN STORAGE: NORMAL

## 2024-12-24 NOTE — ANESTHESIA POSTPROCEDURE EVALUATION
Post-Op Assessment Note    CV Status:  Stable    Pain management: adequate       Mental Status:  Alert and awake   Hydration Status:  Euvolemic   PONV Controlled:  Controlled   Airway Patency:  Patent     Post Op Vitals Reviewed: Yes    No anethesia notable event occurred.            Last Filed PACU Vitals:  Vitals Value Taken Time   Temp     Pulse     BP     Resp     SpO2         Modified Abril:  No data recorded